# Patient Record
Sex: FEMALE | Race: OTHER | HISPANIC OR LATINO
[De-identification: names, ages, dates, MRNs, and addresses within clinical notes are randomized per-mention and may not be internally consistent; named-entity substitution may affect disease eponyms.]

---

## 2020-06-24 PROBLEM — Z00.00 ENCOUNTER FOR PREVENTIVE HEALTH EXAMINATION: Status: ACTIVE | Noted: 2020-06-24

## 2020-06-30 ENCOUNTER — APPOINTMENT (OUTPATIENT)
Dept: BREAST CENTER | Facility: CLINIC | Age: 40
End: 2020-06-30
Payer: COMMERCIAL

## 2020-06-30 VITALS
OXYGEN SATURATION: 98 % | RESPIRATION RATE: 16 BRPM | DIASTOLIC BLOOD PRESSURE: 63 MMHG | HEIGHT: 60 IN | BODY MASS INDEX: 26.31 KG/M2 | TEMPERATURE: 98.4 F | WEIGHT: 134 LBS | SYSTOLIC BLOOD PRESSURE: 108 MMHG | HEART RATE: 80 BPM

## 2020-06-30 PROCEDURE — 99203 OFFICE O/P NEW LOW 30 MIN: CPT

## 2020-07-04 ENCOUNTER — RESULT REVIEW (OUTPATIENT)
Age: 40
End: 2020-07-04

## 2020-07-06 ENCOUNTER — RESULT REVIEW (OUTPATIENT)
Age: 40
End: 2020-07-06

## 2020-07-10 ENCOUNTER — APPOINTMENT (OUTPATIENT)
Dept: BREAST CENTER | Facility: CLINIC | Age: 40
End: 2020-07-10
Payer: COMMERCIAL

## 2020-07-10 VITALS
RESPIRATION RATE: 16 BRPM | HEIGHT: 60 IN | OXYGEN SATURATION: 100 % | WEIGHT: 134 LBS | HEART RATE: 75 BPM | SYSTOLIC BLOOD PRESSURE: 110 MMHG | BODY MASS INDEX: 26.31 KG/M2 | DIASTOLIC BLOOD PRESSURE: 63 MMHG | TEMPERATURE: 97.9 F

## 2020-07-10 PROCEDURE — 99213 OFFICE O/P EST LOW 20 MIN: CPT

## 2020-07-11 ENCOUNTER — RESULT REVIEW (OUTPATIENT)
Age: 40
End: 2020-07-11

## 2020-07-13 ENCOUNTER — RESULT REVIEW (OUTPATIENT)
Age: 40
End: 2020-07-13

## 2020-07-14 ENCOUNTER — RESULT REVIEW (OUTPATIENT)
Age: 40
End: 2020-07-14

## 2020-07-15 ENCOUNTER — RESULT REVIEW (OUTPATIENT)
Age: 40
End: 2020-07-15

## 2020-07-15 ENCOUNTER — APPOINTMENT (OUTPATIENT)
Dept: BREAST CENTER | Facility: HOSPITAL | Age: 40
End: 2020-07-15
Payer: COMMERCIAL

## 2020-07-15 PROCEDURE — 19125 EXCISION BREAST LESION: CPT | Mod: LT

## 2020-07-21 ENCOUNTER — APPOINTMENT (OUTPATIENT)
Dept: BREAST CENTER | Facility: CLINIC | Age: 40
End: 2020-07-21
Payer: COMMERCIAL

## 2020-07-21 VITALS
OXYGEN SATURATION: 98 % | DIASTOLIC BLOOD PRESSURE: 60 MMHG | HEIGHT: 60 IN | HEART RATE: 87 BPM | BODY MASS INDEX: 26.5 KG/M2 | WEIGHT: 135 LBS | SYSTOLIC BLOOD PRESSURE: 106 MMHG

## 2020-07-21 PROCEDURE — 99024 POSTOP FOLLOW-UP VISIT: CPT

## 2020-10-05 ENCOUNTER — APPOINTMENT (OUTPATIENT)
Dept: BREAST CENTER | Facility: CLINIC | Age: 40
End: 2020-10-05
Payer: COMMERCIAL

## 2020-10-05 VITALS
DIASTOLIC BLOOD PRESSURE: 67 MMHG | OXYGEN SATURATION: 99 % | WEIGHT: 134 LBS | HEART RATE: 78 BPM | BODY MASS INDEX: 26.31 KG/M2 | SYSTOLIC BLOOD PRESSURE: 116 MMHG | HEIGHT: 60 IN

## 2020-10-05 PROCEDURE — 99024 POSTOP FOLLOW-UP VISIT: CPT

## 2020-10-12 ENCOUNTER — APPOINTMENT (OUTPATIENT)
Dept: BREAST CENTER | Facility: CLINIC | Age: 40
End: 2020-10-12

## 2020-10-14 ENCOUNTER — APPOINTMENT (OUTPATIENT)
Dept: HEMATOLOGY ONCOLOGY | Facility: CLINIC | Age: 40
End: 2020-10-14
Payer: COMMERCIAL

## 2020-10-14 ENCOUNTER — RESULT REVIEW (OUTPATIENT)
Age: 40
End: 2020-10-14

## 2020-10-14 VITALS
DIASTOLIC BLOOD PRESSURE: 75 MMHG | SYSTOLIC BLOOD PRESSURE: 117 MMHG | HEIGHT: 60 IN | HEART RATE: 71 BPM | WEIGHT: 134.8 LBS | BODY MASS INDEX: 26.46 KG/M2 | TEMPERATURE: 98.2 F | RESPIRATION RATE: 16 BRPM | OXYGEN SATURATION: 100 %

## 2020-10-14 DIAGNOSIS — Z78.9 OTHER SPECIFIED HEALTH STATUS: ICD-10-CM

## 2020-10-14 DIAGNOSIS — G43.909 MIGRAINE, UNSPECIFIED, NOT INTRACTABLE, W/OUT STATUS MIGRAINOSUS: ICD-10-CM

## 2020-10-14 PROCEDURE — 99205 OFFICE O/P NEW HI 60 MIN: CPT

## 2020-10-14 PROCEDURE — 99354: CPT

## 2020-10-14 RX ORDER — MAGNESIUM 200 MG
200 TABLET ORAL
Refills: 0 | Status: ACTIVE | COMMUNITY
Start: 2020-10-14

## 2020-10-15 NOTE — PHYSICAL EXAM
[Fully active, able to carry on all pre-disease performance without restriction] : Status 0 - Fully active, able to carry on all pre-disease performance without restriction [Normal] : affect appropriate [de-identified] : left breast biopsy site healed well, some tenderness to palpation

## 2020-10-15 NOTE — CONSULT LETTER
[Dear  ___] : Dear  [unfilled], [Consult Letter:] : I had the pleasure of evaluating your patient, [unfilled]. [Please see my note below.] : Please see my note below. [Consult Closing:] : Thank you very much for allowing me to participate in the care of this patient.  If you have any questions, please do not hesitate to contact me. [Sincerely,] : Sincerely, [FreeTextEntry3] : Ciara Salguero DO, FACRASHID, FACP\par Medical Oncology and Hematology\par [facility] [place]\par

## 2020-10-15 NOTE — HISTORY OF PRESENT ILLNESS
[de-identified] : Ms. Lebron presents for initial consultation of ADH.  She reports feeling well, only complaint is some tingling to breast biopsy site. \par  [de-identified] : Ms. Lebron is a 40 year old female who was in usual state of health.  She underwent first mammogram 2020 which showed a suspicious architectural distortion in the left breast requiring further evaluation.  \par 2020 Bilateral diagnostic mammogram with ultrasound- Left breast 3:00 focal architectural distortion radial scar vs malignancy recommend biopsy.  Left 9:00 sonographic 1.1cm mass indeterminate/suspicious, ultrasound-guided biopsy recommended. \par 7/15/2020 left brest biopsy- fibrocystic change including radial sclerosing lesion with atypical ductal hyperplasia.  Intraductal papillomas, columnar cell change and numerous macrocyts are also seem.  \par \par Age of Menarche: 11\par LMP: 10/4/2020\par \par Works as

## 2020-10-15 NOTE — ASSESSMENT
[FreeTextEntry1] : We reviewed the diagnosis, prognosis, and natural history of ADH.\par We have reviewed the NCCN guidelines and patient expresses understanding and would like to proceed with the below plan.\par We have reviewed her previous pathology and radiology reports.\par We have discussed that ADH puts her at increased risk of developing breast cancer in the future more so than the general population.\par I would recommend chemoprevention to decrease her risk of developing breast cancer despite no difference in breast cancer- specific or all cause mortality.\par We discussed the options of Tamoxifen, Raloxifene given that she is premenopausal. Reviewed side effects. She expresses understanding and would like to proceed.\par Will give tamoxifen 10 mg every other day\par \par #anemia\par will do work up at [facility]\par b12 wnl - previously was deficient\par \par #Vit D insufficiency - recommend 1000 IU vit D daily\par \par RTC in 1 month to see how she is tolerating tamoxifen. will check CBC with diff, cmp, anemia work up at that time

## 2020-11-17 ENCOUNTER — RESULT REVIEW (OUTPATIENT)
Age: 40
End: 2020-11-17

## 2020-11-17 ENCOUNTER — APPOINTMENT (OUTPATIENT)
Dept: HEMATOLOGY ONCOLOGY | Facility: CLINIC | Age: 40
End: 2020-11-17
Payer: COMMERCIAL

## 2020-11-17 VITALS
DIASTOLIC BLOOD PRESSURE: 69 MMHG | OXYGEN SATURATION: 100 % | HEIGHT: 60 IN | HEART RATE: 80 BPM | BODY MASS INDEX: 25.74 KG/M2 | TEMPERATURE: 97.1 F | WEIGHT: 131.13 LBS | RESPIRATION RATE: 16 BRPM | SYSTOLIC BLOOD PRESSURE: 108 MMHG

## 2020-11-17 PROCEDURE — 99214 OFFICE O/P EST MOD 30 MIN: CPT

## 2020-11-17 RX ORDER — MELATONIN 3 MG
25 MCG TABLET ORAL
Qty: 30 | Refills: 0 | Status: ACTIVE | COMMUNITY
Start: 2020-11-17 | End: 1900-01-01

## 2020-11-17 NOTE — PHYSICAL EXAM
[Fully active, able to carry on all pre-disease performance without restriction] : Status 0 - Fully active, able to carry on all pre-disease performance without restriction [Normal] : affect appropriate [de-identified] : left breast biopsy site healed well, some tenderness to palpation

## 2020-11-17 NOTE — HISTORY OF PRESENT ILLNESS
[de-identified] : Ms. Lebron is a 40 year old female who was in usual state of health.  She underwent first mammogram 2020 which showed a suspicious architectural distortion in the left breast requiring further evaluation.  \par 2020 Bilateral diagnostic mammogram with ultrasound- Left breast 3:00 focal architectural distortion radial scar vs malignancy recommend biopsy.  Left 9:00 sonographic 1.1cm mass indeterminate/suspicious, ultrasound-guided biopsy recommended. \par 7/15/2020 left brest biopsy- fibrocystic change including radial sclerosing lesion with atypical ductal hyperplasia.  Intraductal papillomas, columnar cell change and numerous macrocyts are also seem.  \par \par Age of Menarche: 11\par LMP: 10/4/2020\par \par Works as  [de-identified] : Patient seen and examined and here today for follow up\par Didn't start the Tamoxifen\par She feeling well

## 2020-11-17 NOTE — CONSULT LETTER
[Dear  ___] : Dear  [unfilled], [Consult Letter:] : I had the pleasure of evaluating your patient, [unfilled]. [Please see my note below.] : Please see my note below. [Consult Closing:] : Thank you very much for allowing me to participate in the care of this patient.  If you have any questions, please do not hesitate to contact me. [Sincerely,] : Sincerely, [FreeTextEntry3] : Ciara Salguero DO, FACRASHID, FACP\par Medical Oncology and Hematology\par [facility] [place]\par

## 2021-01-12 ENCOUNTER — RESULT REVIEW (OUTPATIENT)
Age: 41
End: 2021-01-12

## 2021-01-12 ENCOUNTER — APPOINTMENT (OUTPATIENT)
Dept: HEMATOLOGY ONCOLOGY | Facility: CLINIC | Age: 41
End: 2021-01-12
Payer: COMMERCIAL

## 2021-01-12 VITALS
SYSTOLIC BLOOD PRESSURE: 125 MMHG | HEIGHT: 60 IN | BODY MASS INDEX: 26.11 KG/M2 | RESPIRATION RATE: 18 BRPM | TEMPERATURE: 98.2 F | DIASTOLIC BLOOD PRESSURE: 72 MMHG | HEART RATE: 82 BPM | OXYGEN SATURATION: 100 % | WEIGHT: 133 LBS

## 2021-01-12 DIAGNOSIS — M79.661 PAIN IN RIGHT LOWER LEG: ICD-10-CM

## 2021-01-12 PROCEDURE — 99215 OFFICE O/P EST HI 40 MIN: CPT

## 2021-01-12 PROCEDURE — 99072 ADDL SUPL MATRL&STAF TM PHE: CPT

## 2021-01-12 NOTE — ASSESSMENT
[FreeTextEntry1] : #ADH\par We have discussed that ADH puts her at increased risk of developing breast cancer in the future more so than the general population.\par I would recommend chemoprevention to decrease her risk of developing breast cancer despite no difference in breast cancer- specific or all cause mortality.\par continue tamoxifen 10 mg every other day. Complains of R LE calf pain. Will check doppler US r/o DVT\par \par #anemia\par found to be iron deficient.\par We have reviewed the diagnosis of iron deficiency anemia.\par Would recommend parenteral iron in the form of Venofer 200 mg x 5 doses\par We have reviewed the side effects of venofer to include but are not limited to N/V,dizziness, infusion reactions, anaphylaxis, HA, rash.\par \par #R calf pain - since on tamoxifen will check doppler US RLE r/o DVT\par \par #Vit D insufficiency - recommend 1000 IU vit D daily\par \par RTC in 2 months with CBC with diff, cmp, vit D, iron, ferritin, B12, Folate

## 2021-01-12 NOTE — CONSULT LETTER
[Dear  ___] : Dear  [unfilled], [Consult Letter:] : I had the pleasure of evaluating your patient, [unfilled]. [Please see my note below.] : Please see my note below. [Consult Closing:] : Thank you very much for allowing me to participate in the care of this patient.  If you have any questions, please do not hesitate to contact me. [Sincerely,] : Sincerely, [FreeTextEntry3] : Ciara Salguero DO, FACRASHID, FACP\par Medical Oncology and Hematology\par [facility] [place]\par

## 2021-01-12 NOTE — HISTORY OF PRESENT ILLNESS
[de-identified] : Ms. Lebron is a 40 year old female who was in usual state of health.  She underwent first mammogram 2020 which showed a suspicious architectural distortion in the left breast requiring further evaluation.  \par 2020 Bilateral diagnostic mammogram with ultrasound- Left breast 3:00 focal architectural distortion radial scar vs malignancy recommend biopsy.  Left 9:00 sonographic 1.1cm mass indeterminate/suspicious, ultrasound-guided biopsy recommended. \par 7/15/2020 left brest biopsy- fibrocystic change including radial sclerosing lesion with atypical ductal hyperplasia.  Intraductal papillomas, columnar cell change and numerous macrocyts are also seem.  \par \par Age of Menarche: 11\par LMP: 10/4/2020\par \par Works as  [de-identified] : Patient seen and examined and here today for follow up\par Taking Tamoxifen\par Complains of occasional abd pain and also complains of R calf tenderness\par Also complains of fatigue

## 2021-01-12 NOTE — PHYSICAL EXAM
[Fully active, able to carry on all pre-disease performance without restriction] : Status 0 - Fully active, able to carry on all pre-disease performance without restriction [Normal] : affect appropriate [de-identified] : left breast biopsy site healed well, some tenderness to palpation

## 2021-02-23 ENCOUNTER — APPOINTMENT (OUTPATIENT)
Dept: BREAST CENTER | Facility: CLINIC | Age: 41
End: 2021-02-23
Payer: COMMERCIAL

## 2021-02-23 PROCEDURE — 99024 POSTOP FOLLOW-UP VISIT: CPT

## 2021-02-26 NOTE — REASON FOR VISIT
[Follow-Up Visit] : a follow-up visit for [Mastodynia] : mastodynia [FreeTextEntry2] : High risk- ADH

## 2021-03-09 ENCOUNTER — APPOINTMENT (OUTPATIENT)
Dept: HEMATOLOGY ONCOLOGY | Facility: CLINIC | Age: 41
End: 2021-03-09
Payer: COMMERCIAL

## 2021-03-09 ENCOUNTER — RESULT REVIEW (OUTPATIENT)
Age: 41
End: 2021-03-09

## 2021-03-09 VITALS
HEART RATE: 76 BPM | WEIGHT: 135 LBS | SYSTOLIC BLOOD PRESSURE: 118 MMHG | DIASTOLIC BLOOD PRESSURE: 71 MMHG | BODY MASS INDEX: 26.5 KG/M2 | OXYGEN SATURATION: 99 % | HEIGHT: 60 IN | RESPIRATION RATE: 18 BRPM | TEMPERATURE: 99.3 F

## 2021-03-09 PROCEDURE — 99072 ADDL SUPL MATRL&STAF TM PHE: CPT

## 2021-03-09 PROCEDURE — 99214 OFFICE O/P EST MOD 30 MIN: CPT

## 2021-03-09 NOTE — CONSULT LETTER
[Dear  ___] : Dear  [unfilled], [Consult Letter:] : I had the pleasure of evaluating your patient, [unfilled]. [Please see my note below.] : Please see my note below. [Consult Closing:] : Thank you very much for allowing me to participate in the care of this patient.  If you have any questions, please do not hesitate to contact me. [Sincerely,] : Sincerely, [FreeTextEntry3] : Ciara Salguero DO, FACRASHID, FACP\par Medical Oncology and Hematology\par [facility] [place]\par

## 2021-03-09 NOTE — HISTORY OF PRESENT ILLNESS
[de-identified] : Ms. Lebron is a 40 year old female who was in usual state of health.  She underwent first mammogram 2020 which showed a suspicious architectural distortion in the left breast requiring further evaluation.  \par 2020 Bilateral diagnostic mammogram with ultrasound- Left breast 3:00 focal architectural distortion radial scar vs malignancy recommend biopsy.  Left 9:00 sonographic 1.1cm mass indeterminate/suspicious, ultrasound-guided biopsy recommended. \par 7/15/2020 left brest biopsy- fibrocystic change including radial sclerosing lesion with atypical ductal hyperplasia.  Intraductal papillomas, columnar cell change and numerous macrocyts are also seem.  \par \par Age of Menarche: 11\par LMP: 10/4/2020\par \par Works as  [de-identified] : Patient seen and examined and here today for follow up\par Using , Barbra [id number], \par Taking tamoxifen every other day . Denies complaints\par No SOB or one sided leg pain, no dizziness

## 2021-03-09 NOTE — PHYSICAL EXAM
[Fully active, able to carry on all pre-disease performance without restriction] : Status 0 - Fully active, able to carry on all pre-disease performance without restriction [Normal] : affect appropriate [de-identified] : left breast biopsy site healed well, some tenderness to palpation

## 2021-03-09 NOTE — ASSESSMENT
[FreeTextEntry1] : #ADH\par We have discussed that ADH puts her at increased risk of developing breast cancer in the future more so than the general population.\par I would recommend chemoprevention to decrease her risk of developing breast cancer despite no difference in breast cancer- specific or all cause mortality.\par continue tamoxifen 10 mg every other day. \par L sided US scheduled for palpable abnormality - did US bedside and per Dr. Woodward she thinks it was a cyst\par Mammo/US bilaterally in 6/2021 and breast MRI - ordered by Dr. Woodward\par \par #anemia\par found to be iron deficient.\par Received 4 iron infusions\par Eating iron rich food\par rechecking iron and ferritin\par \par #R calf pain - resolved\par \par #Vit D insufficiency - recommend 1000 IU vit D daily\par \par RTC in 3 months with CBC with diff, cmp, vit D, iron, ferritin, B12, Folate

## 2021-03-22 ENCOUNTER — APPOINTMENT (OUTPATIENT)
Dept: BREAST CENTER | Facility: CLINIC | Age: 41
End: 2021-03-22

## 2021-03-29 ENCOUNTER — RESULT REVIEW (OUTPATIENT)
Age: 41
End: 2021-03-29

## 2021-06-21 ENCOUNTER — RESULT REVIEW (OUTPATIENT)
Age: 41
End: 2021-06-21

## 2021-06-21 ENCOUNTER — APPOINTMENT (OUTPATIENT)
Dept: HEMATOLOGY ONCOLOGY | Facility: CLINIC | Age: 41
End: 2021-06-21
Payer: COMMERCIAL

## 2021-06-21 VITALS
DIASTOLIC BLOOD PRESSURE: 57 MMHG | WEIGHT: 138 LBS | BODY MASS INDEX: 27.09 KG/M2 | HEIGHT: 60 IN | RESPIRATION RATE: 16 BRPM | SYSTOLIC BLOOD PRESSURE: 108 MMHG | TEMPERATURE: 98.4 F | HEART RATE: 91 BPM | OXYGEN SATURATION: 97 %

## 2021-06-21 PROCEDURE — 99072 ADDL SUPL MATRL&STAF TM PHE: CPT

## 2021-06-21 PROCEDURE — 99214 OFFICE O/P EST MOD 30 MIN: CPT

## 2021-06-21 NOTE — CONSULT LETTER
[Dear  ___] : Dear  [unfilled], [Consult Letter:] : I had the pleasure of evaluating your patient, [unfilled]. [Please see my note below.] : Please see my note below. [Consult Closing:] : Thank you very much for allowing me to participate in the care of this patient.  If you have any questions, please do not hesitate to contact me. [Sincerely,] : Sincerely, [FreeTextEntry3] : Ciara Salguero DO, FACRASHID, FACP\par Medical Oncology and Hematology\par [facility] [place]\par

## 2021-06-21 NOTE — PHYSICAL EXAM
[Fully active, able to carry on all pre-disease performance without restriction] : Status 0 - Fully active, able to carry on all pre-disease performance without restriction [Normal] : affect appropriate [de-identified] : left breast biopsy site healed well, some tenderness to palpation

## 2021-06-21 NOTE — HISTORY OF PRESENT ILLNESS
[de-identified] : Ms. Lebron is a 40 year old female who was in usual state of health.  She underwent first mammogram 2020 which showed a suspicious architectural distortion in the left breast requiring further evaluation.  \par 2020 Bilateral diagnostic mammogram with ultrasound- Left breast 3:00 focal architectural distortion radial scar vs malignancy recommend biopsy.  Left 9:00 sonographic 1.1cm mass indeterminate/suspicious, ultrasound-guided biopsy recommended. \par 7/15/2020 left brest biopsy- fibrocystic change including radial sclerosing lesion with atypical ductal hyperplasia.  Intraductal papillomas, columnar cell change and numerous macrocyts are also seem.  \par \par Age of Menarche: 11\par LMP: 10/4/2020\par \par Works as  [de-identified] : Patient seen and examined and here today for follow up\par Using , Bertha [id number]\par Taking tamoxifen every other day . Denies complaints\par No SOB or one sided leg pain, no dizziness, no hot flashes, mood swings

## 2021-06-21 NOTE — ASSESSMENT
[FreeTextEntry1] : #ADH\par We have discussed that ADH puts her at increased risk of developing breast cancer in the future more so than the general population.\par I would recommend chemoprevention to decrease her risk of developing breast cancer despite no difference in breast cancer- specific or all cause mortality.\par continue tamoxifen 10 mg every other day. \par L sided US scheduled for palpable abnormality - did US bedside and per Dr. Woodward she thinks it was a cyst\par breast MRI -  bilateral cysts, including the palpable abnormality\par mammo/sono - 8/2021\par \par #anemia\par found to be iron deficient.\par Received 4 iron infusions\par Eating iron rich food\par ferritin 166 in 3/2021\par pending iron and ferritin\par \par #Vit D insufficiency - recommend 1000 IU vit D daily\par \par RTC in 3 months with CBC with diff, cmp, vit D, iron, ferritin, B12, Folate

## 2021-08-25 ENCOUNTER — APPOINTMENT (OUTPATIENT)
Dept: BREAST CENTER | Facility: CLINIC | Age: 41
End: 2021-08-25
Payer: COMMERCIAL

## 2021-08-25 VITALS
BODY MASS INDEX: 27.09 KG/M2 | DIASTOLIC BLOOD PRESSURE: 68 MMHG | SYSTOLIC BLOOD PRESSURE: 116 MMHG | HEART RATE: 88 BPM | HEIGHT: 60 IN | WEIGHT: 138 LBS

## 2021-08-25 VITALS
SYSTOLIC BLOOD PRESSURE: 116 MMHG | HEART RATE: 88 BPM | DIASTOLIC BLOOD PRESSURE: 68 MMHG | BODY MASS INDEX: 27.09 KG/M2 | WEIGHT: 138 LBS | HEIGHT: 60 IN

## 2021-08-25 DIAGNOSIS — Z98.890 OTHER SPECIFIED POSTPROCEDURAL STATES: ICD-10-CM

## 2021-08-25 PROCEDURE — 99072 ADDL SUPL MATRL&STAF TM PHE: CPT

## 2021-08-25 PROCEDURE — 99213 OFFICE O/P EST LOW 20 MIN: CPT

## 2021-08-25 NOTE — PAST MEDICAL HISTORY
[Menstruating] : The patient is menstruating [Menarche Age ____] : age at menarche was [unfilled] [Total Preg ___] : G[unfilled] [Live Births ___] : P[unfilled]  [Age At Live Birth ___] : Age at live birth: [unfilled] [History of Hormone Replacement Treatment] : has no history of hormone replacement treatment

## 2021-08-25 NOTE — HISTORY OF PRESENT ILLNESS
[FreeTextEntry1] : 7/20 radial sclerosing lesion with atypical ductal hyperplasia\par Naomi Bear shows a 43.7% lifetime risk of breast cancer\par \par Patient denies any breast masses or nipple discharge.  She is due for her mammogram and ultrasound.  Patient is taking tamoxifen for chemoprevention and is tolerating it well.  Patient has no family history of breast cancer and this is her first breast biopsy.  Patient originally presented with a 1 cm firm mass in the left breast by ultrasound.  Ultrasound-guided core biopsy revealed in the left breast 9:00 a fibroadenoma and in the left breast 3:00 was a radial sclerosing lesion with a small focus consistent with an intraductal papilloma.  She underwent an excision which revealed a radial sclerosing lesion with atypical ductal hyperplasia.

## 2021-08-25 NOTE — REASON FOR VISIT
[FreeTextEntry1] : Routine follow-up is a high risk patient with a history of atypical ductal hyperplasia

## 2021-09-21 ENCOUNTER — RESULT REVIEW (OUTPATIENT)
Age: 41
End: 2021-09-21

## 2021-09-24 ENCOUNTER — NON-APPOINTMENT (OUTPATIENT)
Age: 41
End: 2021-09-24

## 2021-09-27 ENCOUNTER — RESULT REVIEW (OUTPATIENT)
Age: 41
End: 2021-09-27

## 2021-09-27 ENCOUNTER — APPOINTMENT (OUTPATIENT)
Dept: HEMATOLOGY ONCOLOGY | Facility: CLINIC | Age: 41
End: 2021-09-27
Payer: COMMERCIAL

## 2021-09-27 VITALS
WEIGHT: 139.25 LBS | SYSTOLIC BLOOD PRESSURE: 113 MMHG | TEMPERATURE: 98.9 F | RESPIRATION RATE: 18 BRPM | BODY MASS INDEX: 27.34 KG/M2 | OXYGEN SATURATION: 99 % | HEIGHT: 60 IN | HEART RATE: 81 BPM | DIASTOLIC BLOOD PRESSURE: 60 MMHG

## 2021-09-27 DIAGNOSIS — D24.2 BENIGN NEOPLASM OF LEFT BREAST: ICD-10-CM

## 2021-09-27 PROCEDURE — 99215 OFFICE O/P EST HI 40 MIN: CPT

## 2021-09-27 PROCEDURE — 99072 ADDL SUPL MATRL&STAF TM PHE: CPT

## 2021-09-27 NOTE — PHYSICAL EXAM
[Fully active, able to carry on all pre-disease performance without restriction] : Status 0 - Fully active, able to carry on all pre-disease performance without restriction [Normal] : affect appropriate [de-identified] : left breast biopsy site healed well, some tenderness to palpation

## 2021-09-27 NOTE — CONSULT LETTER
[Dear  ___] : Dear  [unfilled], [Consult Letter:] : I had the pleasure of evaluating your patient, [unfilled]. [Please see my note below.] : Please see my note below. [Consult Closing:] : Thank you very much for allowing me to participate in the care of this patient.  If you have any questions, please do not hesitate to contact me. [Sincerely,] : Sincerely, [FreeTextEntry3] : Ciara Salguero DO, FACRASHID, FACP\par Medical Oncology and Hematology\par [facility] [place]\par

## 2021-09-27 NOTE — ASSESSMENT
[FreeTextEntry1] : #ADH\par We have discussed that ADH puts her at increased risk of developing breast cancer in the future more so than the general population.\par I would recommend chemoprevention to decrease her risk of developing breast cancer despite no difference in breast cancer- specific or all cause mortality.\par continue tamoxifen 10 mg every other day. \par L sided US scheduled for palpable abnormality - did US bedside and per Dr. Woodward she thinks it was a cyst\par breast MRI -  bilateral cysts, including the palpable abnormality\par 9/27/2021 - mammo/sono reviewed- 9/2021 - an irregular hypoechoic focus, suggestive of a possible significant lesion at the peripheral of the left breast 9:00 axis. bilateral cysts. stable left 9:00 fibroadenoma. repeat US thursday. biopsy if necessary. will refill tamoxifen. Follow up with Dr. Quintanilla\par \par #anemia\par found to be iron deficient.\par Received 4 iron infusions\par Eating iron rich food\par ferritin 156 in 3/2021\par 6/21 - ferritin 60\par pending iron and ferritin\par \par #Vit D insufficiency - recommend 1000 IU vit D daily. vitamin D 34. pending vitamin D\par \par RTC in 3 months with CBC with diff, cmp, vit D, iron, ferritin, B12, Folate

## 2021-09-27 NOTE — HISTORY OF PRESENT ILLNESS
[de-identified] : Ms. Lebron is a 40 year old female who was in usual state of health.  She underwent first mammogram 2020 which showed a suspicious architectural distortion in the left breast requiring further evaluation.  \par 2020 Bilateral diagnostic mammogram with ultrasound- Left breast 3:00 focal architectural distortion radial scar vs malignancy recommend biopsy.  Left 9:00 sonographic 1.1cm mass indeterminate/suspicious, ultrasound-guided biopsy recommended. \par 7/15/2020 left brest biopsy- fibrocystic change including radial sclerosing lesion with atypical ductal hyperplasia.  Intraductal papillomas, columnar cell change and numerous macrocyts are also seem.  \par \par Age of Menarche: 11\par LMP: 10/4/2020\par \par Works as  [de-identified] : Patient seen and examined and here today for follow up\par Used : David [id number] \par Taking tamoxifen every other day . Denies complaints\par No SOB or one sided leg pain, no dizziness, no hot flashes, mood swings\par Patient had a recent ultrasound and mammogram which showed a lesion left breast and bilateral cysts are benign. Stable left 9:00 fibroadenoma.

## 2021-09-30 ENCOUNTER — RESULT REVIEW (OUTPATIENT)
Age: 41
End: 2021-09-30

## 2022-01-10 ENCOUNTER — RESULT REVIEW (OUTPATIENT)
Age: 42
End: 2022-01-10

## 2022-01-10 ENCOUNTER — APPOINTMENT (OUTPATIENT)
Dept: HEMATOLOGY ONCOLOGY | Facility: CLINIC | Age: 42
End: 2022-01-10
Payer: COMMERCIAL

## 2022-01-10 VITALS
SYSTOLIC BLOOD PRESSURE: 116 MMHG | HEIGHT: 60 IN | TEMPERATURE: 97.9 F | DIASTOLIC BLOOD PRESSURE: 73 MMHG | WEIGHT: 141 LBS | RESPIRATION RATE: 16 BRPM | OXYGEN SATURATION: 99 % | BODY MASS INDEX: 27.68 KG/M2 | HEART RATE: 85 BPM

## 2022-01-10 PROCEDURE — 36415 COLL VENOUS BLD VENIPUNCTURE: CPT

## 2022-01-10 PROCEDURE — 99214 OFFICE O/P EST MOD 30 MIN: CPT | Mod: 25

## 2022-01-10 NOTE — HISTORY OF PRESENT ILLNESS
[de-identified] : Ms. Lebron is a 40 year old female who was in usual state of health.  She underwent first mammogram 2020 which showed a suspicious architectural distortion in the left breast requiring further evaluation.  \par 2020 Bilateral diagnostic mammogram with ultrasound- Left breast 3:00 focal architectural distortion radial scar vs malignancy recommend biopsy.  Left 9:00 sonographic 1.1cm mass indeterminate/suspicious, ultrasound-guided biopsy recommended. \par 7/15/2020 left brest biopsy- fibrocystic change including radial sclerosing lesion with atypical ductal hyperplasia.  Intraductal papillomas, columnar cell change and numerous macrocyts are also seem.  \par \par Age of Menarche: 11\par LMP: 10/4/2020\par \par Works as  [de-identified] : Patient seen and examined and here today for follow up\par Taking tamoxifen every other day . Denies complaints. occasional hot flashes\par No SOB or one sided leg pain, no dizziness, no hot flashes, mood swings

## 2022-01-10 NOTE — CONSULT LETTER
[Dear  ___] : Dear  [unfilled], [Consult Letter:] : I had the pleasure of evaluating your patient, [unfilled]. [Please see my note below.] : Please see my note below. [Consult Closing:] : Thank you very much for allowing me to participate in the care of this patient.  If you have any questions, please do not hesitate to contact me. [Sincerely,] : Sincerely, [FreeTextEntry3] : Ciara Salguero DO, FACRASHID, FACP\par Medical Oncology and Hematology\par [facility] [place]\par

## 2022-01-10 NOTE — PHYSICAL EXAM
[Fully active, able to carry on all pre-disease performance without restriction] : Status 0 - Fully active, able to carry on all pre-disease performance without restriction [Normal] : affect appropriate [de-identified] : left breast biopsy site healed well, some tenderness to palpation

## 2022-01-10 NOTE — ASSESSMENT
[FreeTextEntry1] : #ADH\par We have discussed that ADH puts her at increased risk of developing breast cancer in the future more so than the general population.\par I would recommend chemoprevention to decrease her risk of developing breast cancer despite no difference in breast cancer- specific or all cause mortality.\par continue tamoxifen 10 mg every other day. \par L sided US scheduled for palpable abnormality - did US bedside and per Dr. Woodward she thinks it was a cyst\par breast MRI -  bilateral cysts, including the palpable abnormality\par 9/27/2021 - mammo/sono reviewed- 9/2021 - an irregular hypoechoic focus, suggestive of a possible significant lesion at the peripheral of the left breast 9:00 axis. bilateral cysts. stable left 9:00 fibroadenoma. repeat US thursday. biopsy if necessary. will refill tamoxifen. Follow up with Dr. Quintanilla\par 1/22/2022- vs and labs reviewed. had US on 10/1/2021 showed dilated ducts and recommended repeat imaging in a year - needs screening mammo in 9/22. will follow up with Dr. Quintanilla - clinical exam benign. continue tamoxifen\par \par #anemia\par found to be iron deficient.\par Received 4 iron infusions\par Eating iron rich food\par ferritin 156 in 3/2021\par 6/21 - ferritin 60\par 1/10/2022- hgb 10.6 - ferritin 20 at last visit. recommend IV venofer 200 mg x 5 doses. discussed side effects\par \par \par RTC in 3 months with CBC with diff, cmp, vit D, iron, ferritin, B12, Folate, vit D

## 2022-04-18 ENCOUNTER — RESULT REVIEW (OUTPATIENT)
Age: 42
End: 2022-04-18

## 2022-04-18 ENCOUNTER — APPOINTMENT (OUTPATIENT)
Dept: HEMATOLOGY ONCOLOGY | Facility: CLINIC | Age: 42
End: 2022-04-18
Payer: COMMERCIAL

## 2022-04-18 VITALS
OXYGEN SATURATION: 98 % | SYSTOLIC BLOOD PRESSURE: 113 MMHG | TEMPERATURE: 98 F | HEIGHT: 60 IN | BODY MASS INDEX: 27.68 KG/M2 | HEART RATE: 83 BPM | RESPIRATION RATE: 18 BRPM | WEIGHT: 141 LBS | DIASTOLIC BLOOD PRESSURE: 63 MMHG

## 2022-04-18 DIAGNOSIS — M25.50 PAIN IN UNSPECIFIED JOINT: ICD-10-CM

## 2022-04-18 PROCEDURE — 99213 OFFICE O/P EST LOW 20 MIN: CPT | Mod: 25

## 2022-04-18 PROCEDURE — 36415 COLL VENOUS BLD VENIPUNCTURE: CPT

## 2022-04-19 ENCOUNTER — NON-APPOINTMENT (OUTPATIENT)
Age: 42
End: 2022-04-19

## 2022-04-19 NOTE — ASSESSMENT
[FreeTextEntry1] : #ADH\par We have discussed that ADH puts her at increased risk of developing breast cancer in the future more so than the general population.\par I would recommend chemoprevention to decrease her risk of developing breast cancer despite no difference in breast cancer- specific or all cause mortality.\par continue tamoxifen 10 mg every other day. \par L sided US scheduled for palpable abnormality - did US bedside and per Dr. Woodward she thinks it was a cyst\par breast MRI -  bilateral cysts, including the palpable abnormality\par 9/27/2021 - mammo/sono reviewed- 9/2021 - an irregular hypoechoic focus, suggestive of a possible significant lesion at the peripheral of the left breast 9:00 axis. bilateral cysts. stable left 9:00 fibroadenoma. repeat US thursday. biopsy if necessary. will refill tamoxifen. Follow up with Dr. Quintanilla\par 1/22/2022- vs and labs reviewed. had US on 10/1/2021 showed dilated ducts and recommended repeat imaging in a year - needs screening mammo in 9/22. will follow up with Dr. Quintanilla - clinical exam benign. continue tamoxifen\par 4/18/2022 vs reviewed, labs pending, has appt with Dr. Rueda yet end of the month\par \par #anemia\par found to be iron deficient.\par Received 4 iron infusions\par Eating iron rich food\par ferritin 156 in 3/2021\par 6/21 - ferritin 60\par 1/10/2022- hgb 10.6 - ferritin 20 at last visit. recommend IV venofer 200 mg x 5 doses. discussed side effects\par 4/18/2022 iron panel pending, patient reports she never received a call to schedule iron infusions after appointment in winter\par \par Case and management discussed with Dr. Salguero\par RTC in 3 months with CBC with diff, cmp, vit D, iron, ferritin, B12, Folate, vit D

## 2022-04-19 NOTE — PHYSICAL EXAM
[Fully active, able to carry on all pre-disease performance without restriction] : Status 0 - Fully active, able to carry on all pre-disease performance without restriction [Normal] : affect appropriate [de-identified] : left breast biopsy site healed well, no adenopathy or masses palpated bilaterally

## 2022-04-19 NOTE — HISTORY OF PRESENT ILLNESS
[de-identified] : Ms. Lebron is a 40 year old female who was in usual state of health.  She underwent first mammogram 2020 which showed a suspicious architectural distortion in the left breast requiring further evaluation.  \par 2020 Bilateral diagnostic mammogram with ultrasound- Left breast 3:00 focal architectural distortion radial scar vs malignancy recommend biopsy.  Left 9:00 sonographic 1.1cm mass indeterminate/suspicious, ultrasound-guided biopsy recommended. \par 7/15/2020 left brest biopsy- fibrocystic change including radial sclerosing lesion with atypical ductal hyperplasia.  Intraductal papillomas, columnar cell change and numerous macrocyts are also seem.  \par \par Age of Menarche: 11\par LMP: 10/4/2020\par \par Works as  [de-identified] : Patient seen and examined and here today for follow up.\par  ID [id number] on video during exam.\par She continues taking tamoxifen every other day; reports  mild hot flashes and aches in her hands.\par She denies dizziness, lightheadedness, SOB, palpitations, nausea, vomiting, constipation, diarrhea, and bleeding.

## 2022-04-19 NOTE — CONSULT LETTER
[Dear  ___] : Dear  [unfilled], [Consult Letter:] : I had the pleasure of evaluating your patient, [unfilled]. [Please see my note below.] : Please see my note below. [Consult Closing:] : Thank you very much for allowing me to participate in the care of this patient.  If you have any questions, please do not hesitate to contact me. [Sincerely,] : Sincerely, [FreeTextEntry3] : Ciara Salguero DO, FACRASHID, FACP\par Medical Oncology and Hematology\par [facility] [place]\par

## 2022-04-25 ENCOUNTER — APPOINTMENT (OUTPATIENT)
Dept: BREAST CENTER | Facility: CLINIC | Age: 42
End: 2022-04-25
Payer: COMMERCIAL

## 2022-04-25 ENCOUNTER — APPOINTMENT (OUTPATIENT)
Dept: BREAST CENTER | Facility: CLINIC | Age: 42
End: 2022-04-25

## 2022-04-25 VITALS
BODY MASS INDEX: 27.48 KG/M2 | OXYGEN SATURATION: 98 % | DIASTOLIC BLOOD PRESSURE: 66 MMHG | HEIGHT: 60 IN | HEART RATE: 79 BPM | WEIGHT: 140 LBS | SYSTOLIC BLOOD PRESSURE: 114 MMHG

## 2022-04-25 DIAGNOSIS — R92.2 INCONCLUSIVE MAMMOGRAM: ICD-10-CM

## 2022-04-25 DIAGNOSIS — N60.12 DIFFUSE CYSTIC MASTOPATHY OF LEFT BREAST: ICD-10-CM

## 2022-04-25 DIAGNOSIS — N60.11 DIFFUSE CYSTIC MASTOPATHY OF LEFT BREAST: ICD-10-CM

## 2022-04-25 PROCEDURE — 99213 OFFICE O/P EST LOW 20 MIN: CPT

## 2022-04-25 NOTE — PAST MEDICAL HISTORY
[Menstruating] : The patient is menstruating [Menarche Age ____] : age at menarche was [unfilled] [History of Hormone Replacement Treatment] : has no history of hormone replacement treatment [Total Preg ___] : G[unfilled] [Live Births ___] : P[unfilled]  [Age At Live Birth ___] : Age at live birth: [unfilled]

## 2022-04-25 NOTE — HISTORY OF PRESENT ILLNESS
[FreeTextEntry1] : 7/20 radial sclerosing lesion with atypical ductal hyperplasia\par Naomi Bear shows a 43.7% lifetime risk of breast cancer\par \par Patient denies any breast masses or nipple discharge.  She is due for her breast MRI.  Patient is taking tamoxifen for chemoprevention and is tolerating it well.  Patient has no family history of breast cancer and this is her first breast biopsy.  Patient originally presented with a 1 cm firm mass in the left breast by ultrasound.  Ultrasound-guided core biopsy revealed in the left breast 9:00 a fibroadenoma and in the left breast 3:00 was a radial sclerosing lesion with a small focus consistent with an intraductal papilloma.  She underwent an excision which revealed a radial sclerosing lesion with atypical ductal hyperplasia.

## 2022-04-25 NOTE — PHYSICAL EXAM
[No Supraclavicular Adenopathy] : no supraclavicular adenopathy [No Cervical Adenopathy] : no cervical adenopathy [Examined in the supine and seated position] : examined in the supine and seated position [Symmetrical] : symmetrical [No dominant masses] : no dominant masses in right breast  [No dominant masses] : no dominant masses left breast [No Nipple Retraction] : no left nipple retraction [No Nipple Discharge] : no left nipple discharge [No Axillary Lymphadenopathy] : no left axillary lymphadenopathy [No Rashes] : no rashes [de-identified] : At the 4 o'clock position, subareolar is a 10 mm cyst.

## 2022-05-03 ENCOUNTER — RESULT REVIEW (OUTPATIENT)
Age: 42
End: 2022-05-03

## 2022-07-18 ENCOUNTER — RESULT REVIEW (OUTPATIENT)
Age: 42
End: 2022-07-18

## 2022-07-18 ENCOUNTER — APPOINTMENT (OUTPATIENT)
Dept: HEMATOLOGY ONCOLOGY | Facility: CLINIC | Age: 42
End: 2022-07-18

## 2022-07-18 VITALS
RESPIRATION RATE: 16 BRPM | WEIGHT: 139.25 LBS | SYSTOLIC BLOOD PRESSURE: 106 MMHG | DIASTOLIC BLOOD PRESSURE: 65 MMHG | HEART RATE: 89 BPM | OXYGEN SATURATION: 98 % | TEMPERATURE: 98.1 F | HEIGHT: 60 IN | BODY MASS INDEX: 27.34 KG/M2

## 2022-07-18 DIAGNOSIS — E55.9 VITAMIN D DEFICIENCY, UNSPECIFIED: ICD-10-CM

## 2022-07-18 PROCEDURE — 99214 OFFICE O/P EST MOD 30 MIN: CPT | Mod: 25

## 2022-07-18 PROCEDURE — 36415 COLL VENOUS BLD VENIPUNCTURE: CPT

## 2022-07-18 NOTE — ASSESSMENT
[FreeTextEntry1] : #ADH\par We have discussed that ADH puts her at increased risk of developing breast cancer in the future more so than the general population.\par I would recommend chemoprevention to decrease her risk of developing breast cancer despite no difference in breast cancer- specific or all cause mortality.\par continue tamoxifen 10 mg every other day. \par L sided US scheduled for palpable abnormality - did US bedside and per Dr. Woodward she thinks it was a cyst\par breast MRI -  bilateral cysts, including the palpable abnormality\par 9/27/2021 - mammo/sono reviewed- 9/2021 - an irregular hypoechoic focus, suggestive of a possible significant lesion at the peripheral of the left breast 9:00 axis. bilateral cysts. stable left 9:00 fibroadenoma. repeat US thursday. biopsy if necessary. will refill tamoxifen. Follow up with Dr. Quintanilla\par 1/22/2022- vs and labs reviewed. had US on 10/1/2021 showed dilated ducts and recommended repeat imaging in a year - needs screening mammo in 9/22. will follow up with Dr. Quintanilla - clinical exam benign. continue tamoxifen\par 4/18/2022 vs reviewed, labs pending, has appt with Dr. Rueda yet end of the month\par 7/18/2022 vs reviewed; continue tamoxifen\par notes from Dr. Rueda reviewed-left breast cyst s/p MRI negative; next mammo/US 9/2022 and appt 10/2022; \par \par #anemia\par found to be iron deficient.\par Received 4 iron infusions\par Eating iron rich food\par ferritin 156 in 3/2021\par 6/21 - ferritin 60\par 1/10/2022- hgb 10.6 - ferritin 20 at last visit. recommend IV venofer 200 mg x 5 doses. discussed side effects\par 4/18/2022 iron panel pending, patient reports she never received a call to schedule iron infusions after appointment in winter\par 7/18/2022 cbc reviewed; hgb 12.0, repeat irons pending\par \par Case and management discussed with Dr. Salguero\par RTC in 4 months with CBC with diff, cmp, vit D, iron, ferritin, B12, Folate, vit D

## 2022-07-18 NOTE — HISTORY OF PRESENT ILLNESS
[de-identified] : Ms. Lebron is a 40 year old female who was in usual state of health.  She underwent first mammogram 2020 which showed a suspicious architectural distortion in the left breast requiring further evaluation.  \par 2020 Bilateral diagnostic mammogram with ultrasound- Left breast 3:00 focal architectural distortion radial scar vs malignancy recommend biopsy.  Left 9:00 sonographic 1.1cm mass indeterminate/suspicious, ultrasound-guided biopsy recommended. \par 7/15/2020 left brest biopsy- fibrocystic change including radial sclerosing lesion with atypical ductal hyperplasia.  Intraductal papillomas, columnar cell change and numerous macrocyts are also seem.  \par \par Age of Menarche: 11\par LMP: 10/4/2020\par \par Works as  [de-identified] : Patient seen and examined and here today for follow up.\par  ID [id number] on video during exam.\par She continues taking tamoxifen every other day; reports feeling well.\par LMP 7/1/2022; periods are heavier and more irregular.\par She reports improved energy since receiving iron infusions.\par She denies dizziness, lightheadedness, SOB, palpitations, nausea, vomiting, constipation, diarrhea, and bleeding.

## 2022-07-18 NOTE — PHYSICAL EXAM
[Fully active, able to carry on all pre-disease performance without restriction] : Status 0 - Fully active, able to carry on all pre-disease performance without restriction [Normal] : affect appropriate [de-identified] : no adenopathy or masses palpated bilaterally

## 2022-07-19 ENCOUNTER — NON-APPOINTMENT (OUTPATIENT)
Age: 42
End: 2022-07-19

## 2022-09-20 ENCOUNTER — RESULT REVIEW (OUTPATIENT)
Age: 42
End: 2022-09-20

## 2022-10-24 ENCOUNTER — APPOINTMENT (OUTPATIENT)
Dept: BREAST CENTER | Facility: CLINIC | Age: 42
End: 2022-10-24

## 2022-10-24 VITALS
SYSTOLIC BLOOD PRESSURE: 111 MMHG | HEIGHT: 60 IN | HEART RATE: 85 BPM | BODY MASS INDEX: 27.48 KG/M2 | DIASTOLIC BLOOD PRESSURE: 70 MMHG | OXYGEN SATURATION: 99 % | WEIGHT: 140 LBS

## 2022-10-24 DIAGNOSIS — N64.89 OTHER SPECIFIED DISORDERS OF BREAST: ICD-10-CM

## 2022-10-24 PROCEDURE — 99213 OFFICE O/P EST LOW 20 MIN: CPT

## 2022-10-24 NOTE — HISTORY OF PRESENT ILLNESS
[FreeTextEntry1] : 7/20 radial sclerosing lesion with atypical ductal hyperplasia\par Naomi Bear shows a 43.7% lifetime risk of breast cancer\par \par Patient denies any breast masses or nipple discharge.  Recent mammogram and ultrasound was unremarkable.  Patient is taking tamoxifen for chemoprevention and is tolerating it well.  \par \par Patient has no family history of breast cancer and this is her first breast biopsy.  Patient originally presented with a 1 cm firm mass in the left breast by ultrasound.  Ultrasound-guided core biopsy revealed in the left breast 9:00 a fibroadenoma and in the left breast 3:00 was a radial sclerosing lesion with a small focus consistent with an intraductal papilloma.  She underwent an excision which revealed a radial sclerosing lesion with atypical ductal hyperplasia.

## 2022-10-24 NOTE — PHYSICAL EXAM
Daily Note     Today's date: 2019  Patient name: Kiley Mathur  : 2005  MRN: [id number]  Referring provider: Pablo Rich MD  Dx:   Encounter Diagnosis     ICD-10-CM    1  Left shoulder pain, unspecified chronicity M25 512    2  Left ankle pain, unspecified chronicity M25 572                   Subjective: Pt reports that she's been trying to focus on keeping her L foot straight during swim practice, but that it still doesn't feel right  Pt also has c/o R HS pain with activity         Objective: See treatment diary below  Daily Treatment Diary         Exercise Diary                   T-band IV, EV, DF Green  X30  green  x30  Green  x30                 SLS with BT 2#  3x15  2# 3x15  2#  3x15                 SLS with alpha A-Z  2x  A-Z   2x  A-Z  2x                 Gastroc str 3x30"  3x30''  3x30"                 Soleus str 3x30"  3x30''   3x30"                 Toe raises x30  x30   x30                 Eccentric lowering 3x10  3x10   3x10                                                                                                                                                                                                                 Marching with ab brace    x20 ea  x20ea                 Standing hip abd 3x10  3x10  x30                 Bridging x30  x30  x30                 SL RDL x30  x30  x30                 Standing HS curls x30  x30  x30                    Exercise Diary               UBE 3F/3B 3F/3B 3F/3B  3F/3B  3F/3B             Scap 4 Blue  3x10 Blue  3x12 Blue  3x15  Blue 3x15  Blue  3x15             UE alphabet 1#  3x 1#  3x 2X  No break  2x   No break  2x  No  break             SL ER 2#  3x10 2#  3x10 x40   x40  x40             Prone pro/ret 3x10 3x10 x30  x30  x30             Wall slides 1#  3x10 1#  3x10 x30  No break  x30 No break  x20  No  Break             Prone raises x 3 3x10 3x10 x20  x20  x20                                                                                                                                                                                                                                                                                                               Assessment: Tolerated treatment well  Patient demonstrated fatigue post treatment, exhibited good technique with therapeutic exercises and would benefit from continued PT  Pt was able to perform RDL's with less pain after correction was made to her technique  Pt quite fatigued at end of session  Plan: Continue per plan of care  [No Supraclavicular Adenopathy] : no supraclavicular adenopathy [No Cervical Adenopathy] : no cervical adenopathy [Examined in the supine and seated position] : examined in the supine and seated position [Symmetrical] : symmetrical [No dominant masses] : no dominant masses in right breast  [No dominant masses] : no dominant masses left breast [No Nipple Retraction] : no left nipple retraction [No Nipple Discharge] : no left nipple discharge [No Axillary Lymphadenopathy] : no left axillary lymphadenopathy [No Rashes] : no rashes [de-identified] : At the 4 o'clock position, subareolar is a 10 mm cyst.

## 2022-11-14 ENCOUNTER — APPOINTMENT (OUTPATIENT)
Dept: HEMATOLOGY ONCOLOGY | Facility: CLINIC | Age: 42
End: 2022-11-14

## 2022-11-14 ENCOUNTER — RESULT REVIEW (OUTPATIENT)
Age: 42
End: 2022-11-14

## 2022-11-14 VITALS
HEART RATE: 78 BPM | RESPIRATION RATE: 16 BRPM | HEIGHT: 60 IN | DIASTOLIC BLOOD PRESSURE: 62 MMHG | WEIGHT: 142.06 LBS | SYSTOLIC BLOOD PRESSURE: 113 MMHG | OXYGEN SATURATION: 99 % | TEMPERATURE: 97.2 F | BODY MASS INDEX: 27.89 KG/M2

## 2022-11-14 PROCEDURE — 36415 COLL VENOUS BLD VENIPUNCTURE: CPT

## 2022-11-14 PROCEDURE — 99213 OFFICE O/P EST LOW 20 MIN: CPT | Mod: 25

## 2022-11-14 NOTE — HISTORY OF PRESENT ILLNESS
[de-identified] : Ms. Lebron is a 40 year old female who was in usual state of health.  She underwent first mammogram 2020 which showed a suspicious architectural distortion in the left breast requiring further evaluation.  \par 2020 Bilateral diagnostic mammogram with ultrasound- Left breast 3:00 focal architectural distortion radial scar vs malignancy recommend biopsy.  Left 9:00 sonographic 1.1cm mass indeterminate/suspicious, ultrasound-guided biopsy recommended. \par 7/15/2020 left brest biopsy- fibrocystic change including radial sclerosing lesion with atypical ductal hyperplasia.  Intraductal papillomas, columnar cell change and numerous macrocyts are also seem.  \par \par Age of Menarche: 11\par LMP: 10/4/2020\par \par Works as  [de-identified] : Patient seen and examined and here today for follow up.\par  ID [id number] on video during exam.\par She continues taking tamoxifen every other day; reports mild hot flashes\par LMP 9/1/2022; periods remain heavy and irregular.\par She denies dizziness, lightheadedness, SOB, palpitations, nausea, vomiting, constipation, diarrhea, and bleeding.

## 2022-11-14 NOTE — PHYSICAL EXAM
[Fully active, able to carry on all pre-disease performance without restriction] : Status 0 - Fully active, able to carry on all pre-disease performance without restriction [Normal] : affect appropriate [de-identified] : no adenopathy or masses palpated bilaterally

## 2022-11-14 NOTE — DISCUSSION/SUMMARY
[FreeTextEntry1] :  ID [id number] left voicemail to relay message to patient:\par iron levels, vitamin D, and vitamin b are stable; we will repeat blood work in 4 months,\par she should contact office is she develops any symptoms or has any questions.

## 2022-11-14 NOTE — ASSESSMENT
[FreeTextEntry1] : #ADH\par We have discussed that ADH puts her at increased risk of developing breast cancer in the future more so than the general population.\par I would recommend chemoprevention to decrease her risk of developing breast cancer despite no difference in breast cancer- specific or all cause mortality.\par continue tamoxifen 10 mg every other day. \par L sided US scheduled for palpable abnormality - did US bedside and per Dr. Woodward she thinks it was a cyst\par breast MRI -  bilateral cysts, including the palpable abnormality\par 9/27/2021 - mammo/sono reviewed- 9/2021 - an irregular hypoechoic focus, suggestive of a possible significant lesion at the peripheral of the left breast 9:00 axis. bilateral cysts. stable left 9:00 fibroadenoma. repeat US thursday. biopsy if necessary. will refill tamoxifen. Follow up with Dr. Quintanilla\par 1/22/2022- vs and labs reviewed. had US on 10/1/2021 showed dilated ducts and recommended repeat imaging in a year - needs screening mammo in 9/22. will follow up with Dr. Quintanilla - clinical exam benign. continue tamoxifen\par 4/18/2022 vs reviewed, labs pending, has appt with Dr. Ruead yet end of the month\par 7/18/2022 vs reviewed; continue tamoxifen\par notes from Dr. Rueda reviewed-left breast cyst s/p MRI negative; next mammo/US 9/2022 and appt 10/2022; \par 11/14/2022 vs and CBC reviewed; WBC 7.7, hgb 12.4, plt 234; continue tamoxifen \par -Dr. Rueda's note from 10/24/2022 and mammogram/US 9/2022 reviewed no evidence of malignancy, repeat 12 months; MRI due 2023\par \par #anemia\par found to be iron deficient.\par Received 4 iron infusions\par Eating iron rich food\par ferritin 156 in 3/2021\par 6/21 - ferritin 60\par 1/10/2022- hgb 10.6 - ferritin 20 at last visit. recommend IV venofer 200 mg x 5 doses. discussed side effects\par 4/18/2022 iron panel pending, patient reports she never received a call to schedule iron infusions after appointment in winter\par 7/18/2022 cbc reviewed; hgb 12.0, repeat irons pending\par 11/14/2022 hgb 12.4, irons pending\par \par Case and management discussed with Dr. Salguero\par RTC in 4 months with CBC with diff, cmp, vit D, iron, ferritin, B12, Folate, vit D

## 2022-11-15 ENCOUNTER — NON-APPOINTMENT (OUTPATIENT)
Age: 42
End: 2022-11-15

## 2022-11-28 ENCOUNTER — TRANSCRIPTION ENCOUNTER (OUTPATIENT)
Age: 42
End: 2022-11-28

## 2023-01-10 ENCOUNTER — NON-APPOINTMENT (OUTPATIENT)
Age: 43
End: 2023-01-10

## 2023-02-22 DIAGNOSIS — N63.20 UNSPECIFIED LUMP IN THE LEFT BREAST, UNSPECIFIED QUADRANT: ICD-10-CM

## 2023-02-22 DIAGNOSIS — D24.2 BENIGN NEOPLASM OF LEFT BREAST: ICD-10-CM

## 2023-02-22 DIAGNOSIS — E53.8 DEFICIENCY OF OTHER SPECIFIED B GROUP VITAMINS: ICD-10-CM

## 2023-02-27 ENCOUNTER — RESULT REVIEW (OUTPATIENT)
Age: 43
End: 2023-02-27

## 2023-02-27 ENCOUNTER — APPOINTMENT (OUTPATIENT)
Dept: HEMATOLOGY ONCOLOGY | Facility: CLINIC | Age: 43
End: 2023-02-27
Payer: COMMERCIAL

## 2023-02-27 VITALS
BODY MASS INDEX: 27.88 KG/M2 | WEIGHT: 142 LBS | SYSTOLIC BLOOD PRESSURE: 122 MMHG | OXYGEN SATURATION: 100 % | DIASTOLIC BLOOD PRESSURE: 66 MMHG | HEIGHT: 60 IN | TEMPERATURE: 98.1 F | HEART RATE: 81 BPM | RESPIRATION RATE: 16 BRPM

## 2023-02-27 PROCEDURE — 36415 COLL VENOUS BLD VENIPUNCTURE: CPT

## 2023-02-27 PROCEDURE — 99213 OFFICE O/P EST LOW 20 MIN: CPT | Mod: 25

## 2023-02-27 NOTE — PHYSICAL EXAM
[Fully active, able to carry on all pre-disease performance without restriction] : Status 0 - Fully active, able to carry on all pre-disease performance without restriction [Normal] : affect appropriate [de-identified] : no adenopathy or masses palpated bilaterally

## 2023-02-27 NOTE — ASSESSMENT
[FreeTextEntry1] : #ADH\par We have discussed that ADH puts her at increased risk of developing breast cancer in the future more so than the general population.\par I would recommend chemoprevention to decrease her risk of developing breast cancer despite no difference in breast cancer- specific or all cause mortality.\par continue tamoxifen 10 mg every other day. \par L sided US scheduled for palpable abnormality - did US bedside and per Dr. Woodward she thinks it was a cyst\par breast MRI -  bilateral cysts, including the palpable abnormality\par 9/27/2021 - mammo/sono reviewed- 9/2021 - an irregular hypoechoic focus, suggestive of a possible significant lesion at the peripheral of the left breast 9:00 axis. bilateral cysts. stable left 9:00 fibroadenoma. repeat US thursday. biopsy if necessary. will refill tamoxifen. Follow up with Dr. Quintanilla\par 1/22/2022- vs and labs reviewed. had US on 10/1/2021 showed dilated ducts and recommended repeat imaging in a year - needs screening mammo in 9/22. will follow up with Dr. Quintanilla - clinical exam benign. continue tamoxifen\par 4/18/2022 vs reviewed, labs pending, has appt with Dr. Rueda yet end of the month\par 7/18/2022 vs reviewed; continue tamoxifen\par notes from Dr. Rueda reviewed-left breast cyst s/p MRI negative; next mammo/US 9/2022 and appt 10/2022; \par 11/14/2022 vs and CBC reviewed; WBC 7.7, hgb 12.4, plt 234; continue tamoxifen \par -Dr. Rueda's note from 10/24/2022 and mammogram/US 9/2022 reviewed no evidence of malignancy, repeat 12 months; MRI due 2023\par 2/27/2023 vs and CBC reviewed; WBC 7.3, hgb 12.2, plt 209\par -continue tamoxifen QOD\par -pending breast MRI spring 2023\par \par #anemia\par found to be iron deficient.\par Received 4 iron infusions\par Eating iron rich food\par ferritin 156 in 3/2021\par 6/21 - ferritin 60\par 1/10/2022- hgb 10.6 - ferritin 20 at last visit. recommend IV venofer 200 mg x 5 doses. discussed side effects\par 4/18/2022 iron panel pending, patient reports she never received a call to schedule iron infusions after appointment in winter\par 7/18/2022 cbc reviewed; hgb 12.0, repeat irons pending\par 11/14/2022 hgb 12.4, irons pending\par 2/27/2023 hgb 12.2, repeat irons pending \par \par Case and management discussed with Dr. Salguero\par RTC in 4 months with CBC with diff, cmp, vit D, iron, ferritin, B12, Folate, vit D

## 2023-02-27 NOTE — HISTORY OF PRESENT ILLNESS
[de-identified] : Ms. Lebron is a 40 year old female who was in usual state of health.  She underwent first mammogram 2020 which showed a suspicious architectural distortion in the left breast requiring further evaluation.  \par 2020 Bilateral diagnostic mammogram with ultrasound- Left breast 3:00 focal architectural distortion radial scar vs malignancy recommend biopsy.  Left 9:00 sonographic 1.1cm mass indeterminate/suspicious, ultrasound-guided biopsy recommended. \par 7/15/2020 left brest biopsy- fibrocystic change including radial sclerosing lesion with atypical ductal hyperplasia.  Intraductal papillomas, columnar cell change and numerous macrocyts are also seem.  \par \par Age of Menarche: 11\par LMP: 10/4/2020\par \par Works as  [de-identified] : Patient seen and examined and here today for follow up.\par  ID [id number] on video during exam. She completed her iron infusions a few weeks ago;  She reports more energy. \par She continues taking tamoxifen every other day; reports mild hot flashes.\par LMP 1/29/2023; periods remain heavy and irregular.\par She denies dizziness, lightheadedness, SOB, palpitations, nausea, vomiting, constipation, diarrhea, and bleeding.

## 2023-02-27 NOTE — DISCUSSION/SUMMARY
[FreeTextEntry1] :  ID [id number] left voicemail to relay message to patient:\par iron levels, vitamin D, and vitamin b are stable; we will repeat blood work in 4 months,\par she should contact office is she develops any symptoms or has any questions.

## 2023-02-28 ENCOUNTER — NON-APPOINTMENT (OUTPATIENT)
Age: 43
End: 2023-02-28

## 2023-04-24 ENCOUNTER — APPOINTMENT (OUTPATIENT)
Dept: BREAST CENTER | Facility: CLINIC | Age: 43
End: 2023-04-24
Payer: COMMERCIAL

## 2023-04-24 VITALS
HEIGHT: 60 IN | WEIGHT: 140 LBS | HEART RATE: 86 BPM | OXYGEN SATURATION: 98 % | TEMPERATURE: 98 F | DIASTOLIC BLOOD PRESSURE: 71 MMHG | BODY MASS INDEX: 27.48 KG/M2 | SYSTOLIC BLOOD PRESSURE: 119 MMHG

## 2023-04-24 PROCEDURE — 99213 OFFICE O/P EST LOW 20 MIN: CPT

## 2023-04-24 NOTE — HISTORY OF PRESENT ILLNESS
[FreeTextEntry1] : 7/20 radial sclerosing lesion with atypical ductal hyperplasia\par Naomi Bear shows a 43.7% lifetime risk of breast cancer\par \par Patient denies any breast masses or nipple discharge.  \par Patient is taking tamoxifen for chemoprevention and is tolerating it well.  \par \par Patient has no family history of breast cancer and this is her first breast biopsy.  Patient originally presented with a 1 cm firm mass in the left breast by ultrasound.  Ultrasound-guided core biopsy revealed in the left breast 9:00 a fibroadenoma and in the left breast 3:00 was a radial sclerosing lesion with a small focus consistent with an intraductal papilloma.  She underwent an excision which revealed a radial sclerosing lesion with atypical ductal hyperplasia.

## 2023-04-24 NOTE — PHYSICAL EXAM
[No Supraclavicular Adenopathy] : no supraclavicular adenopathy [Examined in the supine and seated position] : examined in the supine and seated position [No Cervical Adenopathy] : no cervical adenopathy [Symmetrical] : symmetrical [No dominant masses] : no dominant masses in right breast  [No dominant masses] : no dominant masses left breast [No Nipple Retraction] : no left nipple retraction [No Nipple Discharge] : no left nipple discharge [No Axillary Lymphadenopathy] : no left axillary lymphadenopathy [No Rashes] : no rashes [de-identified] : At the 4 o'clock position, subareolar is a 10 mm cyst.

## 2023-05-10 ENCOUNTER — RESULT REVIEW (OUTPATIENT)
Age: 43
End: 2023-05-10

## 2023-06-19 ENCOUNTER — APPOINTMENT (OUTPATIENT)
Dept: HEMATOLOGY ONCOLOGY | Facility: CLINIC | Age: 43
End: 2023-06-19
Payer: COMMERCIAL

## 2023-06-19 ENCOUNTER — RESULT REVIEW (OUTPATIENT)
Age: 43
End: 2023-06-19

## 2023-06-19 VITALS
OXYGEN SATURATION: 98 % | DIASTOLIC BLOOD PRESSURE: 66 MMHG | SYSTOLIC BLOOD PRESSURE: 106 MMHG | TEMPERATURE: 97.8 F | BODY MASS INDEX: 27.88 KG/M2 | HEIGHT: 60 IN | HEART RATE: 84 BPM | WEIGHT: 142 LBS | RESPIRATION RATE: 16 BRPM

## 2023-06-19 DIAGNOSIS — D64.9 ANEMIA, UNSPECIFIED: ICD-10-CM

## 2023-06-19 PROCEDURE — 99214 OFFICE O/P EST MOD 30 MIN: CPT | Mod: 25

## 2023-06-19 PROCEDURE — 36415 COLL VENOUS BLD VENIPUNCTURE: CPT

## 2023-06-19 NOTE — DISCUSSION/SUMMARY
[FreeTextEntry1] :  ID [id number] left voicemail to relay message to patient:\par iron levels, vitamin D, and vitamin b are stable; we will repeat blood work in 4 months,\par she should contact office is she develops any symptoms or has any questions.

## 2023-06-19 NOTE — ASSESSMENT
[FreeTextEntry1] : #ADH\par We have discussed that ADH puts her at increased risk of developing breast cancer in the future more so than the general population.\par I would recommend chemoprevention to decrease her risk of developing breast cancer despite no difference in breast cancer- specific or all cause mortality.\par continue tamoxifen 10 mg every other day. \par L sided US scheduled for palpable abnormality - did US bedside and per Dr. Woodward she thinks it was a cyst\par breast MRI -  bilateral cysts, including the palpable abnormality\par 9/27/2021 - mammo/sono reviewed- 9/2021 - an irregular hypoechoic focus, suggestive of a possible significant lesion at the peripheral of the left breast 9:00 axis. bilateral cysts. stable left 9:00 fibroadenoma. repeat US thursday. biopsy if necessary. will refill tamoxifen. Follow up with Dr. Quintanilla\par 1/22/2022- vs and labs reviewed. had US on 10/1/2021 showed dilated ducts and recommended repeat imaging in a year - needs screening mammo in 9/22. will follow up with Dr. Quintanilla - clinical exam benign. continue tamoxifen\par 4/18/2022 vs reviewed, labs pending, has appt with Dr. Rueda yet end of the month\par 7/18/2022 vs reviewed; continue tamoxifen\par notes from Dr. Rueda reviewed-left breast cyst s/p MRI negative; next mammo/US 9/2022 and appt 10/2022; \par 11/14/2022 vs and CBC reviewed; WBC 7.7, hgb 12.4, plt 234; continue tamoxifen \par -Dr. Rueda's note from 10/24/2022 and mammogram/US 9/2022 reviewed no evidence of malignancy, repeat 12 months; MRI due 2023\par 2/27/2023 vs and CBC reviewed; WBC 7.3, hgb 12.2, plt 209\par -continue tamoxifen QOD\par 6/19/23 vs reviewed. CBC reviewed. wbc 7.06, hgb 12.1, plts 228. CMP pending. MRI from 5/2023 - reviewed. no evidence of malignancy. Mammo/sono due 9/23. Continue Tamoxifen. \par \par #anemia\par 12.1. Pending iron, ferritin, b12, folate\par if needed will give iron infusions\par \par RTC in 4 months with CBC with diff, cmp, vit D, iron, ferritin, B12, Folate, vit D

## 2023-06-19 NOTE — HISTORY OF PRESENT ILLNESS
[de-identified] : Ms. Lebron is a 40 year old female who was in usual state of health.  She underwent first mammogram 2020 which showed a suspicious architectural distortion in the left breast requiring further evaluation.  \par 2020 Bilateral diagnostic mammogram with ultrasound- Left breast 3:00 focal architectural distortion radial scar vs malignancy recommend biopsy.  Left 9:00 sonographic 1.1cm mass indeterminate/suspicious, ultrasound-guided biopsy recommended. \par 7/15/2020 left brest biopsy- fibrocystic change including radial sclerosing lesion with atypical ductal hyperplasia.  Intraductal papillomas, columnar cell change and numerous macrocyts are also seem.  \par \par Age of Menarche: 11\par LMP: 10/4/2020\par \par Works as  [de-identified] : Patient seen and examined and here today for follow up.\par She continues taking tamoxifen every other day; reports mild hot flashes but tolerable.\par She denies dizziness, lightheadedness, SOB, palpitations, nausea, vomiting, constipation, diarrhea, and bleeding.

## 2023-06-19 NOTE — PHYSICAL EXAM
[Fully active, able to carry on all pre-disease performance without restriction] : Status 0 - Fully active, able to carry on all pre-disease performance without restriction [Normal] : affect appropriate [de-identified] : no adenopathy or masses palpated bilaterally

## 2023-10-09 ENCOUNTER — RESULT REVIEW (OUTPATIENT)
Age: 43
End: 2023-10-09

## 2023-10-16 ENCOUNTER — APPOINTMENT (OUTPATIENT)
Dept: HEMATOLOGY ONCOLOGY | Facility: CLINIC | Age: 43
End: 2023-10-16

## 2023-10-23 ENCOUNTER — APPOINTMENT (OUTPATIENT)
Dept: HEMATOLOGY ONCOLOGY | Facility: CLINIC | Age: 43
End: 2023-10-23
Payer: COMMERCIAL

## 2023-10-23 ENCOUNTER — APPOINTMENT (OUTPATIENT)
Dept: BREAST CENTER | Facility: CLINIC | Age: 43
End: 2023-10-23
Payer: COMMERCIAL

## 2023-10-23 ENCOUNTER — RESULT REVIEW (OUTPATIENT)
Age: 43
End: 2023-10-23

## 2023-10-23 VITALS
BODY MASS INDEX: 20.9 KG/M2 | HEIGHT: 69 IN | SYSTOLIC BLOOD PRESSURE: 105 MMHG | OXYGEN SATURATION: 98 % | DIASTOLIC BLOOD PRESSURE: 67 MMHG | HEART RATE: 80 BPM | TEMPERATURE: 98.2 F | RESPIRATION RATE: 16 BRPM | WEIGHT: 141.13 LBS

## 2023-10-23 VITALS
HEART RATE: 76 BPM | DIASTOLIC BLOOD PRESSURE: 72 MMHG | WEIGHT: 141 LBS | BODY MASS INDEX: 26.62 KG/M2 | SYSTOLIC BLOOD PRESSURE: 115 MMHG | HEIGHT: 61 IN | OXYGEN SATURATION: 100 %

## 2023-10-23 DIAGNOSIS — D50.9 IRON DEFICIENCY ANEMIA, UNSPECIFIED: ICD-10-CM

## 2023-10-23 DIAGNOSIS — E55.9 VITAMIN D DEFICIENCY, UNSPECIFIED: ICD-10-CM

## 2023-10-23 DIAGNOSIS — Z91.89 OTHER SPECIFIED PERSONAL RISK FACTORS, NOT ELSEWHERE CLASSIFIED: ICD-10-CM

## 2023-10-23 PROCEDURE — 99214 OFFICE O/P EST MOD 30 MIN: CPT | Mod: 25

## 2023-10-23 PROCEDURE — 99213 OFFICE O/P EST LOW 20 MIN: CPT

## 2023-10-23 PROCEDURE — 36415 COLL VENOUS BLD VENIPUNCTURE: CPT

## 2023-10-23 RX ORDER — KETOROLAC TROMETHAMINE 10 MG/1
10 TABLET, FILM COATED ORAL
Qty: 10 | Refills: 0 | Status: DISCONTINUED | COMMUNITY
Start: 2022-10-03 | End: 2023-10-23

## 2023-10-23 RX ORDER — FOLIC ACID 1 MG/1
1 TABLET ORAL
Qty: 30 | Refills: 0 | Status: DISCONTINUED | COMMUNITY
Start: 2022-10-03 | End: 2023-10-23

## 2023-10-23 RX ORDER — ERGOCALCIFEROL 1.25 MG/1
1.25 MG CAPSULE ORAL
Qty: 8 | Refills: 0 | Status: DISCONTINUED | COMMUNITY
Start: 2022-11-15 | End: 2023-10-23

## 2023-10-23 RX ORDER — CYANOCOBALAMIN (VITAMIN B-12) 100 MCG
100 TABLET ORAL
Refills: 0 | Status: DISCONTINUED | COMMUNITY
Start: 2020-10-14 | End: 2023-10-23

## 2023-10-24 PROBLEM — Z91.89 AT HIGH RISK FOR BREAST CANCER: Status: ACTIVE | Noted: 2021-02-23

## 2023-11-18 PROBLEM — E55.9 VITAMIN D DEFICIENCY: Status: ACTIVE | Noted: 2020-11-17

## 2023-11-18 PROBLEM — D50.9 ANEMIA, IRON DEFICIENCY: Status: ACTIVE | Noted: 2021-01-12

## 2024-02-09 ENCOUNTER — NON-APPOINTMENT (OUTPATIENT)
Age: 44
End: 2024-02-09

## 2024-02-09 ENCOUNTER — APPOINTMENT (OUTPATIENT)
Dept: OBGYN | Facility: CLINIC | Age: 44
End: 2024-02-09
Payer: COMMERCIAL

## 2024-02-09 VITALS
WEIGHT: 140 LBS | HEIGHT: 61 IN | SYSTOLIC BLOOD PRESSURE: 100 MMHG | DIASTOLIC BLOOD PRESSURE: 70 MMHG | BODY MASS INDEX: 26.43 KG/M2

## 2024-02-09 DIAGNOSIS — N92.1 EXCESSIVE AND FREQUENT MENSTRUATION WITH IRREGULAR CYCLE: ICD-10-CM

## 2024-02-09 DIAGNOSIS — Z01.419 ENCOUNTER FOR GYNECOLOGICAL EXAMINATION (GENERAL) (ROUTINE) W/OUT ABNORMAL FINDINGS: ICD-10-CM

## 2024-02-09 DIAGNOSIS — Z97.5 PRESENCE OF (INTRAUTERINE) CONTRACEPTIVE DEVICE: ICD-10-CM

## 2024-02-09 DIAGNOSIS — Z11.51 ENCOUNTER FOR SCREENING FOR HUMAN PAPILLOMAVIRUS (HPV): ICD-10-CM

## 2024-02-09 DIAGNOSIS — Z12.4 ENCOUNTER FOR SCREENING FOR MALIGNANT NEOPLASM OF CERVIX: ICD-10-CM

## 2024-02-09 PROCEDURE — 99386 PREV VISIT NEW AGE 40-64: CPT

## 2024-02-09 NOTE — PLAN
[FreeTextEntry1] : Discussed plan of care with patient. Will schedule for endometrial biopsy. Procedure described in detail.

## 2024-02-09 NOTE — PHYSICAL EXAM
[Chaperone Declined] : Patient declined chaperone [Appropriately responsive] : appropriately responsive [Alert] : alert [No Acute Distress] : no acute distress [Soft] : soft [Non-tender] : non-tender [Non-distended] : non-distended [No HSM] : No HSM [No Mass] : no mass [Oriented x3] : oriented x3 [Examination Of The Breasts] : a normal appearance [No Discharge] : no discharge [No Masses] : no breast masses were palpable [No Lesions] : no lesions  [Labia Majora] : normal [Labia Minora] : normal [Pink Rugae] : pink rugae [IUD String] : an IUD string was noted [Normal] : normal [Uterine Adnexae] : normal [Tenderness] : nontender [Enlarged ___ wks] : not enlarged [FreeTextEntry5] : no CMT [FreeTextEntry8] : no adnexal masses or tenderness

## 2024-02-09 NOTE — HISTORY OF PRESENT ILLNESS
[FreeTextEntry1] : 43-year-old P2 presents for annual GYN exam.  Sexually active with long-term partner; has a ParaGard IUD which was inserted about 4 years ago.  Patient has been on tamoxifen for the past 3 years for history of atypical ductal hyperplasia that was removed 7/2020.  Periods occur every month, sometimes every other month and can be very heavy. Pt is anemic.  She was seen at [facility] for menorrhagia last month.  An ultrasound was performed there 6 days after her menstrual period began.  The endometrium was found to be thickened 12.48 mm.  Both ovaries appeared normal no free fluid was identified in the cul-de-sac and the IUD was well-positioned.  Patient called her oncologist/hematologist. Dr. Salguero, and was advised to discontinue the tamoxifen, which she discontinued on Monday.  Denies any history of abnormal Paps.  Reports last Pap approximately 3 years ago.  Last mammogram was normal she is up-to-date, and she is followed by Dr. Soto (breast surgery).  Denies any family history of cancer of the breast, uterus, ovaries, or colon.  Patient lives with partner, and her son aged 20 and daughter aged 14.  Patient works as a .

## 2024-02-12 LAB — HPV HIGH+LOW RISK DNA PNL CVX: NOT DETECTED

## 2024-02-15 ENCOUNTER — APPOINTMENT (OUTPATIENT)
Dept: OBGYN | Facility: CLINIC | Age: 44
End: 2024-02-15
Payer: COMMERCIAL

## 2024-02-15 VITALS
HEIGHT: 61 IN | DIASTOLIC BLOOD PRESSURE: 70 MMHG | WEIGHT: 141 LBS | SYSTOLIC BLOOD PRESSURE: 100 MMHG | BODY MASS INDEX: 26.62 KG/M2

## 2024-02-15 DIAGNOSIS — R93.89 ABNORMAL FINDINGS ON DIAGNOSTIC IMAGING OF OTHER SPECIFIED BODY STRUCTURES: ICD-10-CM

## 2024-02-15 DIAGNOSIS — N93.9 ABNORMAL UTERINE AND VAGINAL BLEEDING, UNSPECIFIED: ICD-10-CM

## 2024-02-15 PROCEDURE — 58100 BIOPSY OF UTERUS LINING: CPT

## 2024-02-15 PROCEDURE — 81025 URINE PREGNANCY TEST: CPT

## 2024-02-15 NOTE — PLAN
[FreeTextEntry1] : Patient counseled and consent obtained Endometrial biopsy performed Tissue sent to pathology Follow-up in 3 weeks for results and postop check

## 2024-02-15 NOTE — PROCEDURE
[Endometrial Biopsy] : Endometrial biopsy [Irregular Bleeding] : irregular uterine bleeding [Thickened Endometrium] : thickened endometrium [Risks] : risks [Benefits] : benefits [Alternatives] : alternatives [Infection] : infection [Bleeding] : bleeding [Uterine Perforation] : uterine perforation [Pain] : pain [Negative] : negative pregnancy test [Ibuprofen ___ mg] : ibuprofen [unfilled] ~Umg [None] : none [Easy Passage] : Easy passage [Sounded to ___ cm] : sounded to [unfilled] ~Ucm [Anteverted] : anteverted [Scant] : scant [Specimen Collected] : collected [Sent to Pathology] : placed in buffered formalin and sent for pathology [Tolerated Well] : Patient tolerated the procedure well [No Complications] : No complications [LMPDate] : 01/25/2024 [de-identified] : IUD strings visualized

## 2024-02-20 LAB — CYTOLOGY CVX/VAG DOC THIN PREP: NORMAL

## 2024-02-26 LAB
CORE LAB BIOPSY: NORMAL
HCG UR QL: NEGATIVE
QUALITY CONTROL: YES

## 2024-03-04 ENCOUNTER — RESULT REVIEW (OUTPATIENT)
Age: 44
End: 2024-03-04

## 2024-03-04 ENCOUNTER — APPOINTMENT (OUTPATIENT)
Dept: HEMATOLOGY ONCOLOGY | Facility: CLINIC | Age: 44
End: 2024-03-04
Payer: COMMERCIAL

## 2024-03-04 VITALS
SYSTOLIC BLOOD PRESSURE: 114 MMHG | RESPIRATION RATE: 16 BRPM | HEIGHT: 61 IN | HEART RATE: 83 BPM | WEIGHT: 145.19 LBS | BODY MASS INDEX: 27.41 KG/M2 | DIASTOLIC BLOOD PRESSURE: 59 MMHG | OXYGEN SATURATION: 100 % | TEMPERATURE: 97.7 F

## 2024-03-04 DIAGNOSIS — N84.0 POLYP OF CORPUS UTERI: ICD-10-CM

## 2024-03-04 DIAGNOSIS — N60.92 UNSPECIFIED BENIGN MAMMARY DYSPLASIA OF LEFT BREAST: ICD-10-CM

## 2024-03-04 PROCEDURE — G2211 COMPLEX E/M VISIT ADD ON: CPT

## 2024-03-04 PROCEDURE — 99214 OFFICE O/P EST MOD 30 MIN: CPT

## 2024-03-04 PROCEDURE — 36415 COLL VENOUS BLD VENIPUNCTURE: CPT

## 2024-03-04 NOTE — REVIEW OF SYSTEMS
[Negative] : Allergic/Immunologic [Abdominal Pain] : abdominal pain [Joint Pain] : joint pain [FreeTextEntry2] : has been having intermittent headaches for the last couple of weeks, tylenol helps [FreeTextEntry7] : slight abdominal pain 2-3 months intermittently [FreeTextEntry9] : R arm pain that has been increasing in the last 2 weeks

## 2024-03-04 NOTE — HISTORY OF PRESENT ILLNESS
[de-identified] : Ms. Lebron is a 43 year old female who was in usual state of health.  She underwent first mammogram 2020 which showed a suspicious architectural distortion in the left breast requiring further evaluation.    2020 Bilateral diagnostic mammogram with ultrasound- Left breast 3:00 focal architectural distortion radial scar vs malignancy recommend biopsy.  Left 9:00 sonographic 1.1cm mass indeterminate/suspicious, ultrasound-guided biopsy recommended.   7/15/2020 left brest biopsy- fibrocystic change including radial sclerosing lesion with atypical ductal hyperplasia.  Intraductal papillomas, columnar cell change and numerous macrocyts are also seem.    Age of Menarche: 11 LMP: 10/4/2020  Works as  [de-identified] : Patient seen and examined and here today for follow up for the management of ADH.  She was taking tamoxifen every other day but stopped due to GYN abnormality had Endometrial biopsy - showed endometrial polyp Denies fevers/chills, HA, dizziness, SOB, cough, CP, palpitations, abd pain, n/v/d, swelling to extremities, back pain, hematuria, BRBPR, abnormal vaginal bleeding.  used: Roxanne [id number]

## 2024-03-04 NOTE — DISCUSSION/SUMMARY
[FreeTextEntry1] :  ID [id number] left voicemail to relay message to patient:\par  iron levels, vitamin D, and vitamin b are stable; we will repeat blood work in 4 months,\par  she should contact office is she develops any symptoms or has any questions.

## 2024-03-04 NOTE — PHYSICAL EXAM
[Fully active, able to carry on all pre-disease performance without restriction] : Status 0 - Fully active, able to carry on all pre-disease performance without restriction [Normal] : grossly intact [de-identified] : no adenopathy or masses palpated bilaterally L cyst 9:00

## 2024-03-04 NOTE — REASON FOR VISIT
[Follow-Up Visit] : a follow-up [[employer] ] : provided by [employer]   [FreeTextEntry2] : ADH [Interpreters_IDNumber] : [id number] [Interpreters_FullName] : Cata  [TWNoteComboBox1] : Ugandan

## 2024-03-04 NOTE — ASSESSMENT
[FreeTextEntry1] : #ADH - We have discussed that ADH puts her at increased risk of developing breast cancer in the future more so than the general population. - I would recommend chemoprevention to decrease her risk of developing breast cancer despite no difference in breast cancer- specific or all cause mortality. - Continue tamoxifen 10 mg every other day.  - L sided US scheduled for palpable abnormality - did US bedside and per Dr. Woodward she thinks it was a cyst breast MRI -  bilateral cysts, including the palpable abnormality - 9/27/2021 - mammo/sono reviewed- 9/2021 - an irregular hypoechoic focus, suggestive of a possible significant lesion at the peripheral of the left breast 9:00 axis. bilateral cysts. stable left 9:00 fibroadenoma. repeat US thursday. biopsy if necessary. will refill tamoxifen. Follow up with Dr. Quintanilla - 1/22/2022- vs and labs reviewed. had US on 10/1/2021 showed dilated ducts and recommended repeat imaging in a year - needs screening mammo in 9/22. will follow up with Dr. Quintanilla - clinical exam benign. continue tamoxifen - 4/18/2022 vs reviewed, labs pending, has appt with Dr. Rueda yet end of the month - 7/18/2022 vs reviewed; continue tamoxifen notes from Dr. Rueda reviewed-left breast cyst s/p MRI negative; next mammo/US 9/2022 and appt 10/2022;  - 11/14/2022 vs and CBC reviewed; WBC 7.7, hgb 12.4, plt 234; continue tamoxifen Dr. Rueda's note from 10/24/2022 and mammogram/US 9/2022 reviewed no evidence of malignancy, repeat 12 months; MRI due 2023 - 2/27/2023 vs and CBC reviewed; WBC 7.3, hgb 12.2, plt 209 continue tamoxifen QOD - 6/19/23 vs reviewed. CBC reviewed. wbc 7.06, hgb 12.1, plts 228. CMP pending. MRI from 5/2023 - reviewed. no evidence of malignancy. Mammo/sono due 9/23. Continue Tamoxifen.  - 10/23/23 vs and CBC reviewed; WBC 6.23, hgb 11.5. plt 234. s/p mammo/sono 10/2023 BIRADS 2, reviewed. s/p follow up with  today, note reivewed. Continue Tamoxifen every other day.  3/4/24-  vs and labs reviewed. labs drawn in office today. cbc wnl. reviewed path from endometrial biopsy - polyp. will continue to be monitored by GYN. resume tamoxifen. MRI breast due in 5/24. Then will need follow up with Dr. Soto  #Anemia  - Hgb 11.5 - received IV iron. pending results from iron and ferritin  #Vit D deficiency  - Repeat levels today  RTC in 6 months with CBC with diff, cmp, vit D, iron, ferritin, B12, Folate

## 2024-05-13 ENCOUNTER — RESULT REVIEW (OUTPATIENT)
Age: 44
End: 2024-05-13

## 2024-06-25 RX ORDER — TAMOXIFEN CITRATE 10 MG/1
10 TABLET, FILM COATED ORAL
Qty: 90 | Refills: 3 | Status: ACTIVE | COMMUNITY
Start: 2020-10-14 | End: 1900-01-01

## 2024-09-16 ENCOUNTER — APPOINTMENT (OUTPATIENT)
Dept: HEMATOLOGY ONCOLOGY | Facility: CLINIC | Age: 44
End: 2024-09-16
Payer: COMMERCIAL

## 2024-09-16 ENCOUNTER — RESULT REVIEW (OUTPATIENT)
Age: 44
End: 2024-09-16

## 2024-09-16 VITALS
TEMPERATURE: 97.5 F | OXYGEN SATURATION: 99 % | RESPIRATION RATE: 16 BRPM | SYSTOLIC BLOOD PRESSURE: 106 MMHG | HEART RATE: 79 BPM | DIASTOLIC BLOOD PRESSURE: 69 MMHG | BODY MASS INDEX: 27 KG/M2 | HEIGHT: 61 IN | WEIGHT: 143 LBS

## 2024-09-16 DIAGNOSIS — N60.92 UNSPECIFIED BENIGN MAMMARY DYSPLASIA OF LEFT BREAST: ICD-10-CM

## 2024-09-16 PROCEDURE — 36415 COLL VENOUS BLD VENIPUNCTURE: CPT

## 2024-09-16 PROCEDURE — 99214 OFFICE O/P EST MOD 30 MIN: CPT | Mod: 25

## 2024-09-17 NOTE — BEGINNING OF VISIT
[0] : 2) Feeling down, depressed, or hopeless: Not at all (0) [[id number]] : 0 [Pain Scale: ___] : On a scale of 1-10, today the patient's pain is a(n) [unfilled]. [Never] : Never [Reviewed, no changes] : Reviewed, no changes

## 2024-09-17 NOTE — BEGINNING OF VISIT
Would like 100 day per insurance       [0] : 2) Feeling down, depressed, or hopeless: Not at all (0) [[id number]] : 0 [Pain Scale: ___] : On a scale of 1-10, today the patient's pain is a(n) [unfilled]. [Never] : Never [Reviewed, no changes] : Reviewed, no changes

## 2024-09-17 NOTE — HISTORY OF PRESENT ILLNESS
[de-identified] : Ms. Lebron is a 43 year old female who was in usual state of health.  She underwent first mammogram 2020 which showed a suspicious architectural distortion in the left breast requiring further evaluation.    2020 Bilateral diagnostic mammogram with ultrasound- Left breast 3:00 focal architectural distortion radial scar vs malignancy recommend biopsy.  Left 9:00 sonographic 1.1cm mass indeterminate/suspicious, ultrasound-guided biopsy recommended.   7/15/2020 left brest biopsy- fibrocystic change including radial sclerosing lesion with atypical ductal hyperplasia.  Intraductal papillomas, columnar cell change and numerous macrocyts are also seem.    Age of Menarche: 11 LMP: 10/4/2020  Works as  [de-identified] : Patient seen and examined and here today for follow up for the management of ADH. Remains on tamoxifen. Tolerating without issue. She is s/p Venofer. Has appt for mammo coming up. Continues to menstruate LMP 9/9/24. Has appt coming up with Dr. Soto. Denies fevers/chills, HA, dizziness, SOB, cough, CP, palpitations, abd pain, n/v/d, swelling to extremities, back pain, hematuria, BRBPR.

## 2024-09-17 NOTE — REVIEW OF SYSTEMS
[Abdominal Pain] : abdominal pain [Joint Pain] : joint pain [FreeTextEntry2] : has been having intermittent headaches for the last couple of weeks, tylenol helps [FreeTextEntry7] : slight abdominal pain 2-3 months intermittently [FreeTextEntry9] : R arm pain that has been increasing in the last 2 weeks [Negative] : Musculoskeletal

## 2024-09-17 NOTE — ASSESSMENT
[FreeTextEntry1] : #ADH - We have discussed that ADH puts her at increased risk of developing breast cancer in the future more so than the general population. - I would recommend chemoprevention to decrease her risk of developing breast cancer despite no difference in breast cancer- specific or all cause mortality. - Continue tamoxifen 10 mg every other day.  - L sided US scheduled for palpable abnormality - did US bedside and per Dr. Woodward she thinks it was a cyst breast MRI -  bilateral cysts, including the palpable abnormality - 9/27/2021 - mammo/sono reviewed- 9/2021 - an irregular hypoechoic focus, suggestive of a possible significant lesion at the peripheral of the left breast 9:00 axis. bilateral cysts. stable left 9:00 fibroadenoma. repeat US thursday. biopsy if necessary. will refill tamoxifen. Follow up with Dr. Quintanilla - 1/22/2022- vs and labs reviewed. had US on 10/1/2021 showed dilated ducts and recommended repeat imaging in a year - needs screening mammo in 9/22. will follow up with Dr. Quintanilla - clinical exam benign. continue tamoxifen - 4/18/2022 vs reviewed, labs pending, has appt with Dr. Rueda yet end of the month - 7/18/2022 vs reviewed; continue tamoxifen notes from Dr. Rueda reviewed-left breast cyst s/p MRI negative; next mammo/US 9/2022 and appt 10/2022;  - 11/14/2022 vs and CBC reviewed; WBC 7.7, hgb 12.4, plt 234; continue tamoxifen Dr. Rueda's note from 10/24/2022 and mammogram/US 9/2022 reviewed no evidence of malignancy, repeat 12 months; MRI due 2023 - 2/27/2023 vs and CBC reviewed; WBC 7.3, hgb 12.2, plt 209 continue tamoxifen QOD - 6/19/23 vs reviewed. CBC reviewed. wbc 7.06, hgb 12.1, plts 228. CMP pending. MRI from 5/2023 - reviewed. no evidence of malignancy. Mammo/sono due 9/23. Continue Tamoxifen.  - 10/23/23 vs and CBC reviewed; WBC 6.23, hgb 11.5. plt 234. s/p mammo/sono 10/2023 BIRADS 2, reviewed. s/p follow up with  today, note reivewed. Continue Tamoxifen every other day.  - 3/4/24-  vs and labs reviewed. labs drawn in office today. cbc wnl. reviewed path from endometrial biopsy - polyp. will continue to be monitored by GYN. resume tamoxifen. MRI breast due in 5/24. Then will need follow up with Dr. Soto - 9/16/24 vs and labs reviewed; Continue Tamoxifen. Tolerating. s/p MRI breast 5/2024. Has upcoming appt for Mammo and then seeing Dr. Soto   #Anemia  - s/p Venofer  - Repeat labs today hgb dropped, possibly may require more Venofer. I reviewed she is now at age of CNY. While VAL may be related to her menses she should consider [facility]. She will meet with GI mds. Given names  #Vit D deficiency  - Repeat levels today   #Health Maintenance  - Mammo has upcoming appt  - MRI 5/2024 - [facility]  due now  - GYN UTD   RTC in 6 months with CBC with diff, cmp, vit D, iron, ferritin, B12, Folate  Case and management discussed with Dr. Salguero

## 2024-09-17 NOTE — HISTORY OF PRESENT ILLNESS
[de-identified] : Ms. Lebron is a 43 year old female who was in usual state of health.  She underwent first mammogram 2020 which showed a suspicious architectural distortion in the left breast requiring further evaluation.    2020 Bilateral diagnostic mammogram with ultrasound- Left breast 3:00 focal architectural distortion radial scar vs malignancy recommend biopsy.  Left 9:00 sonographic 1.1cm mass indeterminate/suspicious, ultrasound-guided biopsy recommended.   7/15/2020 left brest biopsy- fibrocystic change including radial sclerosing lesion with atypical ductal hyperplasia.  Intraductal papillomas, columnar cell change and numerous macrocyts are also seem.    Age of Menarche: 11 LMP: 10/4/2020  Works as  [de-identified] : Patient seen and examined and here today for follow up for the management of ADH. Remains on tamoxifen. Tolerating without issue. She is s/p Venofer. Has appt for mammo coming up. Continues to menstruate LMP 9/9/24. Has appt coming up with Dr. Soto. Denies fevers/chills, HA, dizziness, SOB, cough, CP, palpitations, abd pain, n/v/d, swelling to extremities, back pain, hematuria, BRBPR.

## 2024-09-17 NOTE — DISCUSSION/SUMMARY
[FreeTextEntry1] :  ID [id number] left voicemail to relay message to patient:\par  iron levels, vitamin D, and vitamin b are stable; we will repeat blood work in 4 months,\par  she should contact office is she develops any symptoms or has any questions.

## 2024-09-17 NOTE — PHYSICAL EXAM
[Fully active, able to carry on all pre-disease performance without restriction] : Status 0 - Fully active, able to carry on all pre-disease performance without restriction [Normal] : affect appropriate [de-identified] : no adenopathy or masses palpated bilaterally L cyst 9:00

## 2024-09-17 NOTE — HISTORY OF PRESENT ILLNESS
[de-identified] : Ms. Lebron is a 43 year old female who was in usual state of health.  She underwent first mammogram 2020 which showed a suspicious architectural distortion in the left breast requiring further evaluation.    2020 Bilateral diagnostic mammogram with ultrasound- Left breast 3:00 focal architectural distortion radial scar vs malignancy recommend biopsy.  Left 9:00 sonographic 1.1cm mass indeterminate/suspicious, ultrasound-guided biopsy recommended.   7/15/2020 left brest biopsy- fibrocystic change including radial sclerosing lesion with atypical ductal hyperplasia.  Intraductal papillomas, columnar cell change and numerous macrocyts are also seem.    Age of Menarche: 11 LMP: 10/4/2020  Works as  [de-identified] : Patient seen and examined and here today for follow up for the management of ADH. Remains on tamoxifen. Tolerating without issue. She is s/p Venofer. Has appt for mammo coming up. Continues to menstruate LMP 9/9/24. Has appt coming up with Dr. Soto. Denies fevers/chills, HA, dizziness, SOB, cough, CP, palpitations, abd pain, n/v/d, swelling to extremities, back pain, hematuria, BRBPR.

## 2024-09-17 NOTE — BEGINNING OF VISIT
[0] : 2) Feeling down, depressed, or hopeless: Not at all (0) [[id number]] : 0 [Pain Scale: ___] : On a scale of 1-10, today the patient's pain is a(n) [unfilled]. [Never] : Never [Reviewed, no changes] : Reviewed, no changes

## 2024-09-17 NOTE — DISCUSSION/SUMMARY
[FreeTextEntry1] :  ID [id number] left voicemail to relay message to patient:\par  iron levels, vitamin D, and vitamin b are stable; we will repeat blood work in 4 months,\par  she should contact office is she develops any symptoms or has any questions.

## 2024-09-17 NOTE — PHYSICAL EXAM
[Fully active, able to carry on all pre-disease performance without restriction] : Status 0 - Fully active, able to carry on all pre-disease performance without restriction [Normal] : affect appropriate [de-identified] : no adenopathy or masses palpated bilaterally L cyst 9:00

## 2024-09-17 NOTE — REASON FOR VISIT
[Follow-Up Visit] : a follow-up [[employer] ] : provided by [employer]   [FreeTextEntry2] : ADH [Interpreters_IDNumber] : [id number] [Interpreters_FullName] : Cata  [TWNoteComboBox1] : Tajik

## 2024-09-17 NOTE — HISTORY OF PRESENT ILLNESS
[de-identified] : Ms. Lebron is a 43 year old female who was in usual state of health.  She underwent first mammogram 2020 which showed a suspicious architectural distortion in the left breast requiring further evaluation.    2020 Bilateral diagnostic mammogram with ultrasound- Left breast 3:00 focal architectural distortion radial scar vs malignancy recommend biopsy.  Left 9:00 sonographic 1.1cm mass indeterminate/suspicious, ultrasound-guided biopsy recommended.   7/15/2020 left brest biopsy- fibrocystic change including radial sclerosing lesion with atypical ductal hyperplasia.  Intraductal papillomas, columnar cell change and numerous macrocyts are also seem.    Age of Menarche: 11 LMP: 10/4/2020  Works as  [de-identified] : Patient seen and examined and here today for follow up for the management of ADH. Remains on tamoxifen. Tolerating without issue. She is s/p Venofer. Has appt for mammo coming up. Continues to menstruate LMP 9/9/24. Has appt coming up with Dr. Soto. Denies fevers/chills, HA, dizziness, SOB, cough, CP, palpitations, abd pain, n/v/d, swelling to extremities, back pain, hematuria, BRBPR.

## 2024-09-17 NOTE — PHYSICAL EXAM
[Fully active, able to carry on all pre-disease performance without restriction] : Status 0 - Fully active, able to carry on all pre-disease performance without restriction [Normal] : affect appropriate [de-identified] : no adenopathy or masses palpated bilaterally L cyst 9:00

## 2024-09-17 NOTE — PHYSICAL EXAM
[Fully active, able to carry on all pre-disease performance without restriction] : Status 0 - Fully active, able to carry on all pre-disease performance without restriction [Normal] : affect appropriate [de-identified] : no adenopathy or masses palpated bilaterally L cyst 9:00

## 2024-09-17 NOTE — REASON FOR VISIT
[Follow-Up Visit] : a follow-up [[employer] ] : provided by [employer]   [FreeTextEntry2] : ADH [Interpreters_IDNumber] : [id number] [Interpreters_FullName] : Cata  [TWNoteComboBox1] : [place]

## 2024-09-17 NOTE — DISCUSSION/SUMMARY
[FreeTextEntry1] :  ID [phone] left voicemail to relay message to patient:\par  iron levels, vitamin D, and vitamin b are stable; we will repeat blood work in 4 months,\par  she should contact office is she develops any symptoms or has any questions.

## 2024-09-17 NOTE — BEGINNING OF VISIT
[0] : 2) Feeling down, depressed, or hopeless: Not at all (0) [[id number]] : 0 [Pain Scale: ___] : On a scale of 1-10, today the patient's pain is a(n) [unfilled]. [Never] : Never [Reviewed, no changes] : Reviewed, no changes

## 2024-09-17 NOTE — DISCUSSION/SUMMARY
[FreeTextEntry1] :  ID [id number] left voicemail to relay message to patient:\par  iron levels, vitamin D, and vitamin b are stable; we will repeat blood work in 4 months,\par  she should contact office is she develops any symptoms or has any questions.

## 2024-09-17 NOTE — REASON FOR VISIT
[Follow-Up Visit] : a follow-up [[employer] ] : provided by [employer]   [FreeTextEntry2] : ADH [Interpreters_IDNumber] : [id number] [Interpreters_FullName] : Cata  [TWNoteComboBox1] : Macanese

## 2024-09-17 NOTE — REASON FOR VISIT
[Follow-Up Visit] : a follow-up [[employer] ] : provided by [employer]   [FreeTextEntry2] : ADH [Interpreters_IDNumber] : [id number] [Interpreters_FullName] : Cata  [TWNoteComboBox1] : Palestinian

## 2024-10-28 ENCOUNTER — APPOINTMENT (OUTPATIENT)
Dept: BREAST CENTER | Facility: CLINIC | Age: 44
End: 2024-10-28
Payer: COMMERCIAL

## 2024-10-28 VITALS
WEIGHT: 142 LBS | HEART RATE: 86 BPM | HEIGHT: 60 IN | DIASTOLIC BLOOD PRESSURE: 76 MMHG | SYSTOLIC BLOOD PRESSURE: 121 MMHG | OXYGEN SATURATION: 100 % | BODY MASS INDEX: 27.88 KG/M2

## 2024-10-28 DIAGNOSIS — N60.92 UNSPECIFIED BENIGN MAMMARY DYSPLASIA OF LEFT BREAST: ICD-10-CM

## 2024-10-28 DIAGNOSIS — Z91.89 OTHER SPECIFIED PERSONAL RISK FACTORS, NOT ELSEWHERE CLASSIFIED: ICD-10-CM

## 2024-10-28 DIAGNOSIS — R92.2 INCONCLUSIVE MAMMOGRAM: ICD-10-CM

## 2024-10-28 DIAGNOSIS — N64.89 OTHER SPECIFIED DISORDERS OF BREAST: ICD-10-CM

## 2024-10-28 DIAGNOSIS — R92.30 INCONCLUSIVE MAMMOGRAM: ICD-10-CM

## 2024-10-28 PROCEDURE — 99213 OFFICE O/P EST LOW 20 MIN: CPT

## 2024-11-05 ENCOUNTER — RESULT REVIEW (OUTPATIENT)
Age: 44
End: 2024-11-05

## 2024-11-25 ENCOUNTER — APPOINTMENT (OUTPATIENT)
Dept: GASTROENTEROLOGY | Facility: CLINIC | Age: 44
End: 2024-11-25
Payer: COMMERCIAL

## 2024-11-25 VITALS
OXYGEN SATURATION: 98 % | WEIGHT: 142 LBS | SYSTOLIC BLOOD PRESSURE: 114 MMHG | HEIGHT: 60 IN | DIASTOLIC BLOOD PRESSURE: 60 MMHG | HEART RATE: 81 BPM | BODY MASS INDEX: 27.88 KG/M2

## 2024-11-25 DIAGNOSIS — Z12.11 ENCOUNTER FOR SCREENING FOR MALIGNANT NEOPLASM OF COLON: ICD-10-CM

## 2024-11-25 PROCEDURE — S0285 CNSLT BEFORE SCREEN COLONOSC: CPT

## 2025-01-16 ENCOUNTER — APPOINTMENT (OUTPATIENT)
Dept: GASTROENTEROLOGY | Facility: HOSPITAL | Age: 45
End: 2025-01-16

## 2025-02-20 ENCOUNTER — APPOINTMENT (OUTPATIENT)
Dept: HEMATOLOGY ONCOLOGY | Facility: CLINIC | Age: 45
End: 2025-02-20
Payer: COMMERCIAL

## 2025-02-20 ENCOUNTER — RESULT REVIEW (OUTPATIENT)
Age: 45
End: 2025-02-20

## 2025-02-20 ENCOUNTER — NON-APPOINTMENT (OUTPATIENT)
Age: 45
End: 2025-02-20

## 2025-02-20 VITALS
DIASTOLIC BLOOD PRESSURE: 61 MMHG | TEMPERATURE: 98 F | WEIGHT: 142.5 LBS | HEART RATE: 70 BPM | RESPIRATION RATE: 16 BRPM | HEIGHT: 60 IN | OXYGEN SATURATION: 99 % | SYSTOLIC BLOOD PRESSURE: 106 MMHG | BODY MASS INDEX: 27.98 KG/M2

## 2025-02-20 DIAGNOSIS — D50.9 IRON DEFICIENCY ANEMIA, UNSPECIFIED: ICD-10-CM

## 2025-02-20 DIAGNOSIS — E53.8 DEFICIENCY OF OTHER SPECIFIED B GROUP VITAMINS: ICD-10-CM

## 2025-02-20 DIAGNOSIS — Z91.89 OTHER SPECIFIED PERSONAL RISK FACTORS, NOT ELSEWHERE CLASSIFIED: ICD-10-CM

## 2025-02-20 DIAGNOSIS — N60.92 UNSPECIFIED BENIGN MAMMARY DYSPLASIA OF LEFT BREAST: ICD-10-CM

## 2025-02-20 PROCEDURE — 36415 COLL VENOUS BLD VENIPUNCTURE: CPT

## 2025-02-20 PROCEDURE — 99214 OFFICE O/P EST MOD 30 MIN: CPT | Mod: 25

## 2025-02-20 RX ORDER — VITAMIN B COMPLEX
CAPSULE ORAL
Refills: 0 | Status: ACTIVE | COMMUNITY

## 2025-05-05 ENCOUNTER — NON-APPOINTMENT (OUTPATIENT)
Age: 45
End: 2025-05-05

## 2025-08-25 ENCOUNTER — APPOINTMENT (OUTPATIENT)
Dept: HEMATOLOGY ONCOLOGY | Facility: CLINIC | Age: 45
End: 2025-08-25
Payer: SUBSIDIZED

## 2025-08-25 ENCOUNTER — RESULT REVIEW (OUTPATIENT)
Age: 45
End: 2025-08-25

## 2025-08-25 VITALS
SYSTOLIC BLOOD PRESSURE: 119 MMHG | DIASTOLIC BLOOD PRESSURE: 66 MMHG | WEIGHT: 143.38 LBS | RESPIRATION RATE: 16 BRPM | BODY MASS INDEX: 31.37 KG/M2 | TEMPERATURE: 97.1 F | HEIGHT: 56.54 IN | OXYGEN SATURATION: 99 % | HEART RATE: 80 BPM

## 2025-08-25 DIAGNOSIS — N60.92 UNSPECIFIED BENIGN MAMMARY DYSPLASIA OF LEFT BREAST: ICD-10-CM

## 2025-08-25 PROCEDURE — 36415 COLL VENOUS BLD VENIPUNCTURE: CPT

## 2025-08-25 PROCEDURE — 99214 OFFICE O/P EST MOD 30 MIN: CPT | Mod: 25
